# Patient Record
Sex: FEMALE | Race: WHITE | Employment: FULL TIME | URBAN - METROPOLITAN AREA
[De-identification: names, ages, dates, MRNs, and addresses within clinical notes are randomized per-mention and may not be internally consistent; named-entity substitution may affect disease eponyms.]

---

## 2017-06-04 ENCOUNTER — HOSPITAL ENCOUNTER (EMERGENCY)
Age: 47
Discharge: HOME | End: 2017-06-04
Attending: EMERGENCY MEDICINE
Payer: COMMERCIAL

## 2017-06-04 ENCOUNTER — APPOINTMENT (OUTPATIENT)
Dept: GENERAL RADIOLOGY | Age: 47
End: 2017-06-04
Attending: EMERGENCY MEDICINE
Payer: COMMERCIAL

## 2017-06-04 VITALS
SYSTOLIC BLOOD PRESSURE: 112 MMHG | DIASTOLIC BLOOD PRESSURE: 72 MMHG | OXYGEN SATURATION: 98 % | TEMPERATURE: 97.8 F | HEART RATE: 71 BPM | BODY MASS INDEX: 25.48 KG/M2 | RESPIRATION RATE: 16 BRPM | HEIGHT: 69 IN | WEIGHT: 172 LBS

## 2017-06-04 DIAGNOSIS — S76.012A HIP STRAIN, LEFT, INITIAL ENCOUNTER: ICD-10-CM

## 2017-06-04 DIAGNOSIS — M54.50 ACUTE LEFT-SIDED LOW BACK PAIN WITHOUT SCIATICA: Primary | ICD-10-CM

## 2017-06-04 PROCEDURE — 96372 THER/PROPH/DIAG INJ SC/IM: CPT

## 2017-06-04 PROCEDURE — 74011250636 HC RX REV CODE- 250/636: Performed by: EMERGENCY MEDICINE

## 2017-06-04 PROCEDURE — 99282 EMERGENCY DEPT VISIT SF MDM: CPT

## 2017-06-04 PROCEDURE — 73502 X-RAY EXAM HIP UNI 2-3 VIEWS: CPT

## 2017-06-04 PROCEDURE — 72100 X-RAY EXAM L-S SPINE 2/3 VWS: CPT

## 2017-06-04 RX ORDER — CYCLOBENZAPRINE HCL 5 MG
5 TABLET ORAL
Qty: 15 TAB | Refills: 0 | Status: SHIPPED | OUTPATIENT
Start: 2017-06-04 | End: 2017-06-09

## 2017-06-04 RX ORDER — CYCLOBENZAPRINE HCL 10 MG
10 TABLET ORAL
Status: DISCONTINUED | OUTPATIENT
Start: 2017-06-04 | End: 2017-06-05 | Stop reason: HOSPADM

## 2017-06-04 RX ORDER — IBUPROFEN 600 MG/1
600 TABLET ORAL
Qty: 20 TAB | Refills: 0 | Status: SHIPPED | OUTPATIENT
Start: 2017-06-04 | End: 2017-06-09

## 2017-06-04 RX ORDER — HYDROCODONE BITARTRATE AND ACETAMINOPHEN 5; 325 MG/1; MG/1
1 TABLET ORAL
Qty: 12 TAB | Refills: 0 | Status: ON HOLD | OUTPATIENT
Start: 2017-06-04 | End: 2017-06-09

## 2017-06-04 RX ORDER — KETOROLAC TROMETHAMINE 30 MG/ML
60 INJECTION, SOLUTION INTRAMUSCULAR; INTRAVENOUS
Status: COMPLETED | OUTPATIENT
Start: 2017-06-04 | End: 2017-06-04

## 2017-06-04 RX ADMIN — KETOROLAC TROMETHAMINE 60 MG: 30 INJECTION, SOLUTION INTRAMUSCULAR at 22:05

## 2017-06-05 ENCOUNTER — PATIENT OUTREACH (OUTPATIENT)
Dept: OTHER | Age: 47
End: 2017-06-05

## 2017-06-05 NOTE — ED NOTES
Pt discharged from ED with prescriptions and follow up care instructions; pt verbalized understanding. VSS. NAD. Pt reports improved pain of 2/10 at this time. Ambulatory from ED with steady gait.

## 2017-06-05 NOTE — PROGRESS NOTES
YOUNG NOTE:     Ms. Clarissa Tavarez  has been contacted regarding recent ED visit for radicular back pain after driving and erika ing luggage. .   Verified  and address for HIPAA security. Explained role and verified PCP. Discharge medications were reviewed with the patient by telephone. Date of ED Admission:       Facility patient visited:   [facility]   Reason for Visit:   Sciatica and radicular symptoms   Reviewed scripts given by ED? Yes      Able to obtain prescribed medication? Yes  - only taking ibuprophen at work. Plans to take flexeril later when she can rest and not drive. How is the patient feeling? Pt stated she is better today, still feels radicular symptoms on the left. Better with hot shower. Does the patient have any questions or problems? Not at this time - plans to FU with PCP and maybe be referred to PT when she returns home. Call in. Any barriers with transportation? no   Follow-up Appointment date: Not yet - call in. Reviewed Discharge instructions:  She is doing Stretches as on AVS.  Spoke about ice as better for acute inflammation, but she is traveling, states she discussed with MD in ED. Standing better than walking. Provided patient with my name and contact information and instructed patient if there are any question to call. No further needs at this time. FU in two weeks         Prescriptions   Medication Sig Dispense Start Date End Date Auth. Provider   ibuprofen (MOTRIN) 600 mg tablet Take 1 Tab by mouth every six (6) hours as needed for Pain. 20 Tab 2017  Virginia Del Cid MD   cyclobenzaprine (FLEXERIL) 5 mg tablet Take 1 Tab by mouth three (3) times daily (with meals) for 5 days. 15 Tab 2017 Virginia Del Cid MD   HYDROcodone-acetaminophen [facility]) 5-325 mg per tablet Take 1 Tab by mouth every four (4) hours as needed for Pain. Max Daily Amount: 6 Tabs.  12 Tab 2017

## 2017-06-05 NOTE — ED PROVIDER NOTES
HPI Comments: 42 y.o. female with past medical history significant for neurocardiogenic syncope who presents with chief complaint of back pain. Patient complains of mid lower back pain that radiates to her right side that began today after she was loading some luggage into her car. Patient also complains of some left hip pain that's worse with movement. Patient states she was driving down from [place] when the pain first started. Patient states she felt fine during the first half of the drive then, while she was getting gas and walking around, the pain worsened. Patient states the pain does not radiate down her legs though she is having some difficulty moving her left leg. Patient states she took 4 Ibuprofen, with her last dose at 1730, and 2 Tylenol, last dose at [address]. Patient denies numbness or tingling. Patient denies previous occurrence of sx. There are no other acute medical concerns at this time. Social hx: never smoker, no alcohol  PCP: None    Note written by Chris Dawn, as dictated by Gillian Morris MD 9:30 PM     The history is provided by the patient. No  was used. Past Medical History:   Diagnosis Date    Endocrine disease     Hypothyroid    Neurological disorder     Neurocardiogenic syncope       History reviewed. No pertinent surgical history. History reviewed. No pertinent family history. Social History     Social History    Marital status:      Spouse name: N/A    Number of children: N/A    Years of education: N/A     Occupational History    Not on file. Social History Main Topics    Smoking status: Never Smoker    Smokeless tobacco: Not on file    Alcohol use No    Drug use: No    Sexual activity: Yes     Partners: Female     Birth control/ protection: None     Other Topics Concern    Not on file     Social History Narrative         ALLERGIES: Penicillin g    Review of Systems   Constitutional: Negative for fever.    HENT: Negative for facial swelling. Eyes: Negative for visual disturbance. Respiratory: Negative for chest tightness. Cardiovascular: Negative for chest pain. Gastrointestinal: Negative for abdominal pain. Genitourinary: Negative for dysuria. Musculoskeletal: Positive for arthralgias. Skin: Negative for rash. Neurological: Negative for dizziness and numbness. Hematological: Negative for adenopathy. Psychiatric/Behavioral: Negative for suicidal ideas. Vitals:    06/04/17 2032   BP: 117/75   Pulse: (!) 121   Resp: 18   Temp: 97.9 °F (36.6 °C)   SpO2: 96%   Weight: 78 kg (172 lb)   Height: 5' 9\" (1.753 m)            Physical Exam   Constitutional: She is oriented to person, place, and time. She appears well-developed and well-nourished. No distress. HENT:   Head: Normocephalic and atraumatic. Mouth/Throat: Oropharynx is clear and moist.   Eyes: Pupils are equal, round, and reactive to light. No scleral icterus. Neck: Normal range of motion. Neck supple. No thyromegaly present. Cardiovascular: Normal rate, regular rhythm, normal heart sounds and intact distal pulses. No murmur heard. Pulmonary/Chest: Effort normal and breath sounds normal. No respiratory distress. Abdominal: Soft. Bowel sounds are normal. She exhibits no distension. There is no tenderness. Musculoskeletal: Normal range of motion. She exhibits no edema. Neurological: She is alert and oriented to person, place, and time. Skin: Skin is warm and dry. No rash noted. She is not diaphoretic. Nursing note and vitals reviewed. Note written by Chris Hassan, as dictated by Bryson Leigh MD 9:33 PM      [facility]  ED Course       Procedures    Xray unremarkable. Ambulating without assistance. Stable for discharge home. Most likely muscular injury.

## 2017-06-05 NOTE — ED TRIAGE NOTES
Patient presents to the emergency department reports sacral and left hip pain. Patient states she was putting luggage in her trunk and driving down from [place]. Patient states she felt something when she placed the luggage into the car, but was not in a lot of pain at that point.

## 2017-06-05 NOTE — DISCHARGE INSTRUCTIONS
Back Pain: Care Instructions  Your Care Instructions    Back pain has many possible causes. It is often related to problems with muscles and ligaments of the back. It may also be related to problems with the nerves, discs, or bones of the back. Moving, lifting, standing, sitting, or sleeping in an awkward way can strain the back. Sometimes you don't notice the injury until later. Arthritis is another common cause of back pain. Although it may hurt a lot, back pain usually improves on its own within several weeks. Most people recover in 12 weeks or less. Using good home treatment and being careful not to stress your back can help you feel better sooner. Follow-up care is a key part of your treatment and safety. Be sure to make and go to all appointments, and call your doctor if you are having problems. Its also a good idea to know your test results and keep a list of the medicines you take. How can you care for yourself at home? · Sit or lie in positions that are most comfortable and reduce your pain. Try one of these positions when you lie down:  ¨ Lie on your back with your knees bent and supported by large pillows. ¨ Lie on the floor with your legs on the seat of a sofa or chair. Frances Men on your side with your knees and hips bent and a pillow between your legs. ¨ Lie on your stomach if it does not make pain worse. · Do not sit up in bed, and avoid soft couches and twisted positions. Bed rest can help relieve pain at first, but it delays healing. Avoid bed rest after the first day of back pain. · Change positions every 30 minutes. If you must sit for long periods of time, take breaks from sitting. Get up and walk around, or lie in a comfortable position. · Try using a heating pad on a low or medium setting for 15 to 20 minutes every 2 or 3 hours. Try a warm shower in place of one session with the heating pad. · You can also try an ice pack for 10 to 15 minutes every 2 to 3 hours.  Put a thin cloth between the ice pack and your skin. · Take pain medicines exactly as directed. ¨ If the doctor gave you a prescription medicine for pain, take it as prescribed. ¨ If you are not taking a prescription pain medicine, ask your doctor if you can take an over-the-counter medicine. · Take short walks several times a day. You can start with 5 to 10 minutes, 3 or 4 times a day, and work up to longer walks. Walk on level surfaces and avoid hills and stairs until your back is better. · Return to work and other activities as soon as you can. Continued rest without activity is usually not good for your back. · To prevent future back pain, do exercises to stretch and strengthen your back and stomach. Learn how to use good posture, safe lifting techniques, and proper body mechanics. When should you call for help? Call your doctor now or seek immediate medical care if:  · You have new or worsening numbness in your legs. · You have new or worsening weakness in your legs. (This could make it hard to stand up.)  · You lose control of your bladder or bowels. Watch closely for changes in your health, and be sure to contact your doctor if:  · Your pain gets worse. · You are not getting better after 2 weeks. Where can you learn more? Go to http://nayana-roberta.info/. Enter B740 in the search box to learn more about \"Back Pain: Care Instructions. \"  Current as of: May 23, 2016  Content Version: 11.2  © 0867-4709 [employer]. Care instructions adapted under license by [employer] (which disclaims liability or warranty for this information). If you have questions about a medical condition or this instruction, always ask your healthcare professional. [address] any warranty or liability for your use of this information. We hope that we have addressed all of your medical concerns.  The examination and treatment you received in the Emergency Department were for an emergent problem and were not intended as complete care. It is important that you follow up with your healthcare provider(s) for ongoing care. If your symptoms worsen or do not improve as expected, and you are unable to reach your usual health care provider(s), you should return to the Emergency Department. Today's healthcare is undergoing tremendous change, and patient satisfaction surveys are one of the many tools to assess the quality of medical care. You may receive a survey from the [employer] regarding your experience in the Emergency Department. I hope that your experience has been completely positive, particularly the medical care that I provided. As such, please participate in the survey; anything less than excellent does not meet my expectations or intentions. [address] [place] [address] and [address] [place] [address] participate in nationally recognized quality of care measures. If your blood pressure is greater than 120/80, as reported below, we urge that you seek medical care to address the potential of high blood pressure, commonly known as hypertension. Hypertension can be hereditary or can be caused by certain medical conditions, pain, stress, or \"white coat syndrome. \"       Please make an appointment with your health care provider(s) for follow up of your Emergency Department visit. VITALS:   Patient Vitals for the past 8 hrs:   Temp Pulse Resp BP SpO2   06/04/17 2032 97.9 °F (36.6 °C) (!) 121 18 117/75 96 %          Thank you for allowing us to provide you with medical care today. We realize that you have many choices for your emergency care needs. Please choose us in the future for any continued health care needs. Regards,           Virginia Del Cid MD    [address] [place] [address].   Office: [phone]            No results found for this or any previous visit (from the past 24 hour(s)).     Xr Spine Lumb 2 Or 3 V    Result Date: 6/4/2017  INDICATION:  L lower back pain. EXAM: 3 views lumbar spine. No comparisons. FINDINGS: Alignment is normal. Disc spaces are preserved. No bony destructive lesions or fractures. IMPRESSION: 1. No acute abnormality     Xr Hip Lt W Or Wo Pelv 2-3 Vws    Result Date: 6/4/2017  EXAM:  XR HIP LT W OR WO PELV 2-3 VWS INDICATION:   pain L hip. Questionable injury lifting luggage COMPARISON: None. FINDINGS: An AP view of the pelvis and a frogleg lateral view of the left hip demonstrate no fracture, dislocation or other acute abnormality. IMPRESSION:  No acute abnormality.

## 2017-06-06 ENCOUNTER — HOSPITAL ENCOUNTER (EMERGENCY)
Age: 47
Discharge: ANOTHER HOSPITAL | End: 2017-06-06
Attending: FAMILY MEDICINE

## 2017-06-06 ENCOUNTER — HOSPITAL ENCOUNTER (INPATIENT)
Age: 47
LOS: 3 days | Discharge: HOME | DRG: 299 | End: 2017-06-09
Attending: EMERGENCY MEDICINE | Admitting: FAMILY MEDICINE
Payer: COMMERCIAL

## 2017-06-06 ENCOUNTER — APPOINTMENT (OUTPATIENT)
Dept: CT IMAGING | Age: 47
DRG: 299 | End: 2017-06-06
Attending: PHYSICIAN ASSISTANT
Payer: COMMERCIAL

## 2017-06-06 VITALS
DIASTOLIC BLOOD PRESSURE: 81 MMHG | HEART RATE: 118 BPM | HEIGHT: 69 IN | TEMPERATURE: 97.8 F | BODY MASS INDEX: 24.62 KG/M2 | WEIGHT: 166.2 LBS | SYSTOLIC BLOOD PRESSURE: 132 MMHG | OXYGEN SATURATION: 98 % | RESPIRATION RATE: 16 BRPM

## 2017-06-06 DIAGNOSIS — M79.605 LEFT LEG PAIN: Primary | ICD-10-CM

## 2017-06-06 DIAGNOSIS — M54.5 ACUTE LEFT-SIDED LOW BACK PAIN, WITH SCIATICA PRESENCE UNSPECIFIED: ICD-10-CM

## 2017-06-06 DIAGNOSIS — I26.99 OTHER ACUTE PULMONARY EMBOLISM WITHOUT ACUTE COR PULMONALE (HCC): Primary | ICD-10-CM

## 2017-06-06 PROBLEM — R00.0 TACHYCARDIA: Status: ACTIVE | Noted: 2017-06-06

## 2017-06-06 PROBLEM — I82.402 ACUTE DEEP VEIN THROMBOSIS (DVT) OF LEFT LOWER EXTREMITY (HCC): Status: ACTIVE | Noted: 2017-06-06

## 2017-06-06 LAB
ALBUMIN SERPL BCP-MCNC: 3.8 G/DL (ref 3.5–5)
ALBUMIN/GLOB SERPL: 0.8 {RATIO} (ref 1.1–2.2)
ALP SERPL-CCNC: 81 U/L (ref 45–117)
ALT SERPL-CCNC: 23 U/L (ref 12–78)
ANION GAP BLD CALC-SCNC: 16 MMOL/L (ref 5–15)
APTT PPP: 28.1 SEC (ref 22.1–32.5)
AST SERPL W P-5'-P-CCNC: 12 U/L (ref 15–37)
BASOPHILS # BLD AUTO: 0 K/UL (ref 0–0.1)
BASOPHILS # BLD: 0 % (ref 0–1)
BILIRUB SERPL-MCNC: 0.5 MG/DL (ref 0.2–1)
BUN SERPL-MCNC: 10 MG/DL (ref 6–20)
BUN/CREAT SERPL: 18 (ref 12–20)
CALCIUM SERPL-MCNC: 9.3 MG/DL (ref 8.5–10.1)
CHLORIDE SERPL-SCNC: 100 MMOL/L (ref 97–108)
CO2 SERPL-SCNC: 20 MMOL/L (ref 21–32)
CREAT SERPL-MCNC: 0.55 MG/DL (ref 0.55–1.02)
EOSINOPHIL # BLD: 0.4 K/UL (ref 0–0.4)
EOSINOPHIL NFR BLD: 4 % (ref 0–7)
ERYTHROCYTE [DISTWIDTH] IN BLOOD BY AUTOMATED COUNT: 13.2 % (ref 11.5–14.5)
GLOBULIN SER CALC-MCNC: 4.6 G/DL (ref 2–4)
GLUCOSE SERPL-MCNC: 77 MG/DL (ref 65–100)
HCT VFR BLD AUTO: 39 % (ref 35–47)
HGB BLD-MCNC: 13.5 G/DL (ref 11.5–16)
INR PPP: 1 (ref 0.9–1.1)
LYMPHOCYTES # BLD AUTO: 12 % (ref 12–49)
LYMPHOCYTES # BLD: 1.5 K/UL (ref 0.8–3.5)
MCH RBC QN AUTO: 28.1 PG (ref 26–34)
MCHC RBC AUTO-ENTMCNC: 34.6 G/DL (ref 30–36.5)
MCV RBC AUTO: 81.1 FL (ref 80–99)
MONOCYTES # BLD: 1.1 K/UL (ref 0–1)
MONOCYTES NFR BLD AUTO: 9 % (ref 5–13)
NEUTS SEG # BLD: 9.4 K/UL (ref 1.8–8)
NEUTS SEG NFR BLD AUTO: 75 % (ref 32–75)
PLATELET # BLD AUTO: 364 K/UL (ref 150–400)
POTASSIUM SERPL-SCNC: 3.2 MMOL/L (ref 3.5–5.1)
PROT SERPL-MCNC: 8.4 G/DL (ref 6.4–8.2)
PROTHROMBIN TIME: 10.4 SEC (ref 9–11.1)
RBC # BLD AUTO: 4.81 M/UL (ref 3.8–5.2)
SODIUM SERPL-SCNC: 136 MMOL/L (ref 136–145)
THERAPEUTIC RANGE,PTTT: NORMAL SECS (ref 58–77)
TROPONIN I SERPL-MCNC: <0.04 NG/ML
WBC # BLD AUTO: 12.5 K/UL (ref 3.6–11)

## 2017-06-06 PROCEDURE — 74011250636 HC RX REV CODE- 250/636: Performed by: PHYSICIAN ASSISTANT

## 2017-06-06 PROCEDURE — 84484 ASSAY OF TROPONIN QUANT: CPT | Performed by: FAMILY MEDICINE

## 2017-06-06 PROCEDURE — 96374 THER/PROPH/DIAG INJ IV PUSH: CPT

## 2017-06-06 PROCEDURE — 74011636320 HC RX REV CODE- 636/320: Performed by: EMERGENCY MEDICINE

## 2017-06-06 PROCEDURE — 74011000258 HC RX REV CODE- 258: Performed by: EMERGENCY MEDICINE

## 2017-06-06 PROCEDURE — 65660000000 HC RM CCU STEPDOWN

## 2017-06-06 PROCEDURE — 96372 THER/PROPH/DIAG INJ SC/IM: CPT

## 2017-06-06 PROCEDURE — 71275 CT ANGIOGRAPHY CHEST: CPT

## 2017-06-06 PROCEDURE — 93971 EXTREMITY STUDY: CPT

## 2017-06-06 PROCEDURE — 74011250637 HC RX REV CODE- 250/637: Performed by: FAMILY MEDICINE

## 2017-06-06 PROCEDURE — 85610 PROTHROMBIN TIME: CPT | Performed by: FAMILY MEDICINE

## 2017-06-06 PROCEDURE — 85025 COMPLETE CBC W/AUTO DIFF WBC: CPT | Performed by: PHYSICIAN ASSISTANT

## 2017-06-06 PROCEDURE — 36415 COLL VENOUS BLD VENIPUNCTURE: CPT | Performed by: PHYSICIAN ASSISTANT

## 2017-06-06 PROCEDURE — 93041 RHYTHM ECG TRACING: CPT

## 2017-06-06 PROCEDURE — 85730 THROMBOPLASTIN TIME PARTIAL: CPT | Performed by: FAMILY MEDICINE

## 2017-06-06 PROCEDURE — 94761 N-INVAS EAR/PLS OXIMETRY MLT: CPT

## 2017-06-06 PROCEDURE — 80053 COMPREHEN METABOLIC PANEL: CPT | Performed by: PHYSICIAN ASSISTANT

## 2017-06-06 PROCEDURE — 99284 EMERGENCY DEPT VISIT MOD MDM: CPT

## 2017-06-06 RX ORDER — LEVOTHYROXINE SODIUM 25 UG/1
12.5 TABLET ORAL
COMMUNITY

## 2017-06-06 RX ORDER — ESCITALOPRAM OXALATE 10 MG/1
20 TABLET ORAL
Status: DISCONTINUED | OUTPATIENT
Start: 2017-06-07 | End: 2017-06-09 | Stop reason: HOSPADM

## 2017-06-06 RX ORDER — HYDROCODONE BITARTRATE AND ACETAMINOPHEN 5; 325 MG/1; MG/1
1 TABLET ORAL
Status: DISCONTINUED | OUTPATIENT
Start: 2017-06-06 | End: 2017-06-07

## 2017-06-06 RX ORDER — SODIUM CHLORIDE 0.9 % (FLUSH) 0.9 %
5-10 SYRINGE (ML) INJECTION AS NEEDED
Status: DISCONTINUED | OUTPATIENT
Start: 2017-06-06 | End: 2017-06-09 | Stop reason: HOSPADM

## 2017-06-06 RX ORDER — SODIUM CHLORIDE 0.9 % (FLUSH) 0.9 %
10 SYRINGE (ML) INJECTION
Status: COMPLETED | OUTPATIENT
Start: 2017-06-06 | End: 2017-06-06

## 2017-06-06 RX ORDER — LEVOTHYROXINE SODIUM 112 UG/1
112 TABLET ORAL
COMMUNITY

## 2017-06-06 RX ORDER — HYDROMORPHONE HYDROCHLORIDE 1 MG/ML
1 INJECTION, SOLUTION INTRAMUSCULAR; INTRAVENOUS; SUBCUTANEOUS
Status: DISCONTINUED | OUTPATIENT
Start: 2017-06-06 | End: 2017-06-06

## 2017-06-06 RX ORDER — ENOXAPARIN SODIUM 100 MG/ML
1 INJECTION SUBCUTANEOUS
Status: COMPLETED | OUTPATIENT
Start: 2017-06-06 | End: 2017-06-06

## 2017-06-06 RX ORDER — VENLAFAXINE HYDROCHLORIDE 150 MG/1
150 CAPSULE, EXTENDED RELEASE ORAL DAILY
COMMUNITY

## 2017-06-06 RX ORDER — ESCITALOPRAM OXALATE 20 MG/1
20 TABLET ORAL
COMMUNITY

## 2017-06-06 RX ORDER — SODIUM CHLORIDE 0.9 % (FLUSH) 0.9 %
5-10 SYRINGE (ML) INJECTION EVERY 8 HOURS
Status: DISCONTINUED | OUTPATIENT
Start: 2017-06-07 | End: 2017-06-09 | Stop reason: HOSPADM

## 2017-06-06 RX ORDER — LEVOTHYROXINE SODIUM 50 UG/1
50 TABLET ORAL
COMMUNITY

## 2017-06-06 RX ORDER — ENOXAPARIN SODIUM 100 MG/ML
1 INJECTION SUBCUTANEOUS EVERY 12 HOURS
Status: DISCONTINUED | OUTPATIENT
Start: 2017-06-07 | End: 2017-06-09 | Stop reason: HOSPADM

## 2017-06-06 RX ORDER — LEVONORGESTREL AND ETHINYL ESTRADIOL 100-20(84)
1 KIT ORAL DAILY
Status: ON HOLD | COMMUNITY
End: 2017-06-07 | Stop reason: SINTOL

## 2017-06-06 RX ORDER — MORPHINE SULFATE 4 MG/ML
4 INJECTION, SOLUTION INTRAMUSCULAR; INTRAVENOUS
Status: COMPLETED | OUTPATIENT
Start: 2017-06-06 | End: 2017-06-06

## 2017-06-06 RX ORDER — GLUCOSAMINE/CHONDR SU A SOD 750-600 MG
1 TABLET ORAL DAILY
COMMUNITY

## 2017-06-06 RX ORDER — VENLAFAXINE HYDROCHLORIDE 150 MG/1
150 CAPSULE, EXTENDED RELEASE ORAL DAILY
Status: DISCONTINUED | OUTPATIENT
Start: 2017-06-07 | End: 2017-06-09 | Stop reason: HOSPADM

## 2017-06-06 RX ADMIN — ENOXAPARIN SODIUM 80 MG: 80 INJECTION SUBCUTANEOUS at 22:31

## 2017-06-06 RX ADMIN — IOPAMIDOL 80 ML: 755 INJECTION, SOLUTION INTRAVENOUS at 21:20

## 2017-06-06 RX ADMIN — SODIUM CHLORIDE 100 ML: 900 INJECTION, SOLUTION INTRAVENOUS at 21:20

## 2017-06-06 RX ADMIN — Medication 4 MG: at 20:20

## 2017-06-06 RX ADMIN — Medication 10 ML: at 23:40

## 2017-06-06 RX ADMIN — HYDROCODONE BITARTRATE AND ACETAMINOPHEN 1 TABLET: 5; 325 TABLET ORAL at 23:40

## 2017-06-06 RX ADMIN — Medication 4 MG: at 23:13

## 2017-06-06 RX ADMIN — Medication 10 ML: at 21:20

## 2017-06-06 RX ADMIN — ESCITALOPRAM OXALATE 20 MG: 10 TABLET ORAL at 23:40

## 2017-06-06 NOTE — IP AVS SNAPSHOT
Current Discharge Medication List  
  
START taking these medications Dose & Instructions Dispensing Information Comments Morning Noon Evening Bedtime * apixaban 5 mg tablet Commonly known as:  Arvel Us Your last dose was: Your next dose is: Take 2 Tabs BID X 7 days, then 1 tab BID X 23 days. Quantity:  74 Tab Refills:  0  
     
   
   
   
  
 * apixaban 5 mg tablet Commonly known as:  Arvel Us Your last dose was: Your next dose is:    
   
   
 Dose:  5 mg Take 1 Tab by mouth two (2) times a day for 180 days. Quantity:  60 Tab Refills:  4  
     
   
   
   
  
 famotidine 20 mg tablet Commonly known as:  PEPCID Your last dose was: Your next dose is:    
   
   
 Dose:  20 mg Take 1 Tab by mouth two (2) times a day. Quantity:  20 Tab Refills:  0  
     
   
   
   
  
 naproxen 500 mg tablet Commonly known as:  NAPROSYN Your last dose was: Your next dose is:    
   
   
 Dose:  500 mg Take 1 Tab by mouth two (2) times daily (with meals). Quantity:  6 Tab Refills:  0  
     
   
   
   
  
 polyethylene glycol 17 gram packet Commonly known as:  Jessenia John Your last dose was: Your next dose is:    
   
   
 Dose:  17 g Take 1 Packet by mouth daily. Quantity:  30 Packet Refills:  0  
     
   
   
   
  
 senna-docusate 8.6-50 mg per tablet Commonly known as:  Susan Rogers Your last dose was: Your next dose is:    
   
   
 Dose:  2 Tab Take 2 Tabs by mouth two (2) times a day. Quantity:  20 Tab Refills:  0  
     
   
   
   
  
 * Notice: This list has 2 medication(s) that are the same as other medications prescribed for you. Read the directions carefully, and ask your doctor or other care provider to review them with you. CONTINUE these medications which have NOT CHANGED Dose & Instructions Dispensing Information Comments Morning Noon Evening Bedtime Biotin 2,500 mcg Cap Your last dose was: Your next dose is:    
   
   
 Dose:  1 Can Take 1 Can by mouth daily. Refills:  0  
     
   
   
   
  
 escitalopram oxalate 20 mg tablet Commonly known as:  Latanya Katarzyna Your last dose was: Your next dose is:    
   
   
 Dose:  20 mg Take 20 mg by mouth nightly. Refills:  0 HYDROcodone-acetaminophen 5-325 mg per tablet Commonly known as:  Will Khan Your last dose was: Your next dose is:    
   
   
 Dose:  1 Tab Take 1 Tab by mouth every four (4) hours as needed for Pain. Max Daily Amount: 6 Tabs. Quantity:  12 Tab Refills:  0  
     
   
   
   
  
 * levothyroxine 112 mcg tablet Commonly known as:  SYNTHROID Your last dose was: Your next dose is:    
   
   
 Dose:  112 mcg Take 112 mcg by mouth Daily (before breakfast). Takes 112+12.5+50 Refills:  0  
     
   
   
   
  
 * levothyroxine 25 mcg tablet Commonly known as:  SYNTHROID Your last dose was: Your next dose is:    
   
   
 Dose:  12.5 mcg Take 12.5 mcg by mouth Daily (before breakfast). Takes 112+12.5+50 Refills:  0  
     
   
   
   
  
 * levothyroxine 50 mcg tablet Commonly known as:  SYNTHROID Your last dose was: Your next dose is:    
   
   
 Dose:  50 mcg Take 50 mcg by mouth Daily (before breakfast). Takes 112+12.5+50 Refills:  0  
     
   
   
   
  
 venlafaxine- mg capsule Commonly known as:  EFFEXOR-XR Your last dose was: Your next dose is:    
   
   
 Dose:  150 mg Take 150 mg by mouth daily. Refills:  0  
     
   
   
   
  
 * Notice: This list has 3 medication(s) that are the same as other medications prescribed for you. Read the directions carefully, and ask your doctor or other care provider to review them with you. STOP taking these medications CAMRESE LO 0.10 mg-20 mcg (84)/10 mcg (7) 3mpk Generic drug:  L-Norgest&E Estradiol-E Estrad  
   
  
 cyclobenzaprine 5 mg tablet Commonly known as:  FLEXERIL  
   
  
 ibuprofen 600 mg tablet Commonly known as:  MOTRIN Where to Get Your Medications Information on where to get these meds will be given to you by the nurse or doctor. ! Ask your nurse or doctor about these medications  
  apixaban 5 mg tablet  
 apixaban 5 mg tablet  
 famotidine 20 mg tablet HYDROcodone-acetaminophen 5-325 mg per tablet  
 naproxen 500 mg tablet  
 polyethylene glycol 17 gram packet  
 senna-docusate 8.6-50 mg per tablet

## 2017-06-06 NOTE — ED TRIAGE NOTES
Sent by [facility] for r/o DVT to left leg.  +pain, +redness, +increased warmth. Denies CP or SOB. Taking 600mg ibuprofen with minimal relief.

## 2017-06-06 NOTE — UC PROVIDER NOTE
HPI Comments: Reta Morrissey with hx of neurogenic syncope, hypothyroidism presents with persistent left sided low back pain associated with left hip pain for the past 2 days after lifting heavy suitcases but today noticed left lower leg swelling, erythema, and pain. Was seen in [facility] [place] ED at the day of onset xray od lumbar spine and left hip negative; given motrin, flexeril without significant improvement. The history is provided by the patient. Past Medical History:   Diagnosis Date    Endocrine disease     Hypothyroid    Neurological disorder     Neurocardiogenic syncope        History reviewed. No pertinent surgical history. History reviewed. No pertinent family history. Social History     Social History    Marital status:      Spouse name: N/A    Number of children: N/A    Years of education: N/A     Occupational History    Not on file. Social History Main Topics    Smoking status: Never Smoker    Smokeless tobacco: Not on file    Alcohol use No    Drug use: No    Sexual activity: Yes     Partners: Female     Birth control/ protection: None     Other Topics Concern    Not on file     Social History Narrative                ALLERGIES: Penicillin g    Review of Systems   Constitutional: Negative for chills and fever. Respiratory: Negative for chest tightness, shortness of breath and wheezing. Cardiovascular: Negative for chest pain and palpitations. Gastrointestinal: Negative for nausea and vomiting. Musculoskeletal: Positive for arthralgias, back pain and gait problem. Skin: Positive for color change. Neurological: Negative for dizziness and headaches. Vitals:    06/06/17 1753   BP: 132/81   Pulse: (!) 118   Resp: 16   Temp: 97.8 °F (36.6 °C)   SpO2: 98%   Weight: 75.4 kg (166 lb 3.2 oz)   Height: 5' 9\" (1.753 m)       Physical Exam   Constitutional: She appears well-developed and well-nourished. She appears distressed.    Musculoskeletal:        Left hip: She exhibits tenderness. She exhibits normal range of motion, normal strength, no bony tenderness, no swelling, no crepitus, no deformity and no laceration. Lumbar back: She exhibits decreased range of motion, tenderness, bony tenderness, pain and spasm. She exhibits no swelling, no edema, no deformity and no laceration. Back:         Right lower leg: She exhibits tenderness, swelling and edema. She exhibits no bony tenderness, no deformity and no laceration. LLE: erythematous   Neurological: She is alert. Skin: She is not diaphoretic. Psychiatric: She has a normal mood and affect. Her behavior is normal. Judgment and thought content normal.   Nursing note and vitals reviewed. MDM     Differential Diagnosis; Clinical Impression; Plan:     CLINICAL IMPRESSION:  Left leg pain  (primary encounter diagnosis)  Acute left-sided low back pain, with sciatica presence unspecified    Plan:  1. Referred to ER for R/o DVT  Risk of Significant Complications, Morbidity, and/or Mortality:   Presenting problems: Moderate  Management options:   Moderate      Procedures

## 2017-06-06 NOTE — ED NOTES
Bedside and Verbal shift change report given to Leif (oncoming nurse) by Theo Rubio RN (offgoing nurse). Report included the following information SBAR, ED Summary and Recent Results.

## 2017-06-06 NOTE — ED PROVIDER NOTES
HPI Comments: 41 yo  female with medical hx remarkable for hypothyroidism presenting ambulatory to the ED continued worsening left sided back pain and left leg pain for the past two days that started after lifting a suitcase. Pt seen in this ED and dxd with sciatica following lumb spine xray. Taking flexeril, ibu 600 mg and norco at night with minimal resolve. Today noticed significant erythema and edema to the lt lower extremity. Seen at [facility] urgent care and referred to ED with concern for DVT/PE. Drove from [place] three days ago. No other significant travel. Denies prior hx of DVT/PE. No fever, chills, headache, chest pain, SOB, abdominal pain, constipation, diarrhea or urinary complaint. +OCP use. _hx surgery or prior DVT/PE attributed to patient or family. Patient is a 42 y.o. female presenting with lower extremity edema. The history is provided by the patient. Leg Swelling    Associated symptoms include back pain. Pertinent negatives include no numbness and no neck pain. Past Medical History:   Diagnosis Date    Endocrine disease     Hypothyroid    Neurological disorder     Neurocardiogenic syncope       History reviewed. No pertinent surgical history. History reviewed. No pertinent family history. Social History     Social History    Marital status:      Spouse name: N/A    Number of children: N/A    Years of education: N/A     Occupational History    Not on file. Social History Main Topics    Smoking status: Never Smoker    Smokeless tobacco: Not on file    Alcohol use No    Drug use: No    Sexual activity: Yes     Partners: Female     Birth control/ protection: None     Other Topics Concern    Not on file     Social History Narrative         ALLERGIES: Penicillin g    Review of Systems   Constitutional: Negative. Negative for chills, fatigue and fever. HENT: Negative.   Negative for congestion, ear pain, facial swelling, rhinorrhea, sneezing and sore throat. Eyes: Negative for pain, discharge and itching. Respiratory: Negative for cough, chest tightness and shortness of breath. Cardiovascular: Negative. Negative for chest pain and leg swelling. Gastrointestinal: Negative. Negative for abdominal distention, abdominal pain, constipation, diarrhea, nausea and vomiting. Genitourinary: Negative for difficulty urinating, frequency and urgency. Musculoskeletal: Positive for arthralgias, back pain and joint swelling. Negative for neck pain and neck stiffness. Skin: Positive for color change. Negative for rash. Neurological: Negative for dizziness, numbness and headaches. Psychiatric/Behavioral: Negative for confusion and decreased concentration. All other systems reviewed and are negative. Vitals:    06/06/17 1844   BP: 151/81   Pulse: (!) 131   Resp: 22   Temp: 98.6 °F (37 °C)   SpO2: 99%   Weight: 83.6 kg (184 lb 6.4 oz)   Height: 5' 9\" (1.753 m)            Physical Exam   Constitutional: She is oriented to person, place, and time. She appears well-developed and well-nourished. No distress.  adult female appears uncomfortable     HENT:   Head: Normocephalic and atraumatic. Left Ear: External ear normal.   Eyes: Conjunctivae are normal. Pupils are equal, round, and reactive to light. Neck: Normal range of motion. Neck supple. Cardiovascular: Regular rhythm and normal heart sounds. Tachycardic     Pulmonary/Chest: No respiratory distress. She has no wheezes. tachypneic     Abdominal: Soft. Bowel sounds are normal. She exhibits no distension. There is no tenderness. There is no rebound. Musculoskeletal: Normal range of motion. Legs:  Neurological: She is alert and oriented to person, place, and time. Skin: Skin is warm and dry. No ecchymosis, no laceration and no lesion noted. Psychiatric: She has a normal mood and affect. Her behavior is normal.   Nursing note and vitals reviewed.        MDM  Number of Diagnoses or Management Options  Other acute pulmonary embolism without acute cor pulmonale [facility]):   Diagnosis management comments: 43 yo  female with swelling and pain to left leg and back. Pt uncomfortable appearing with associated tachycardia and tachypnea. Concern for DVT/PE. Plan  CBC, CMP, duplex LLE, CTa and analgesia. Deena MOULTON [place], [place]      ED Course       Procedures   Progress note    Labs and imaging reviewed. Deena MOULTON [place], [place]        CONSULT NOTE:  10:11 Bhumika Figueroa MD spoke with Dr. Clint Leventhal, Consult for Hospitalist. Discussed available diagnostic tests and clinical findings. Provider is in agreement with care plans as outlined. Dr. Clint Leventhal will see and admit the patient.

## 2017-06-06 NOTE — IP AVS SNAPSHOT
[address] 
[phone] Patient: Anais Calhoun MRN: [id number] You are allergic to the following Allergen Reactions Penicillin G Anaphylaxis Recent Documentation Height Weight BMI OB Status Smoking Status 1.753 m 86.5 kg 28.16 kg/m2 Having regular periods Never Smoker Unresulted Labs Order Current Status CULTURE, BLOOD, PAIRED In process Emergency Contacts Name Discharge Info Relation Home Work Mobile Reena Espinal  Mother [14] [phone] About your hospitalization You were admitted on:  June 6, 2017 You last received care in the:  [facility] 4 IM 2 You were discharged on:  June 9, 2017 Unit phone number:  [phone] Why you were hospitalized Your primary diagnosis was:  Pulmonary Embolism (Hcc) Your diagnoses also included: Tachycardia, Acute Deep Vein Thrombosis (Dvt) Of Left Lower Extremity (Hcc) Providers Seen During Your Hospitalizations Provider Role Specialty Primary office phone Lg Carlos MD Attending Provider Emergency Medicine [phone] Matthieu Juarez MD Attending Provider [facility] [phone] Bc Patiño MD Attending Provider Internal Medicine [phone] Your Primary Care Physician (PCP) Primary Care Physician Office Phone Office Fax David Winter [phone] Follow-up Information Follow up With Details Comments Contact Info Kelsy Lock MD On 6/14/2017 Discharge follow up [facility] follow up PCP appointment on Wednesday, 6/14/17 @ 10:20 a.m. [facility] [address] [place] [address] [place] [id number] 
[phone] Current Discharge Medication List  
  
START taking these medications Dose & Instructions Dispensing Information Comments Morning Noon Evening Bedtime * apixaban 5 mg tablet Commonly known as:  Elta Cheek Your last dose was: Your next dose is: Take 2 Tabs BID X 7 days, then 1 tab BID X 23 days. Quantity:  74 Tab Refills:  0  
     
   
   
   
  
 * apixaban 5 mg tablet Commonly known as:  Elta Cheek Your last dose was: Your next dose is:    
   
   
 Dose:  5 mg Take 1 Tab by mouth two (2) times a day for 180 days. Quantity:  60 Tab Refills:  4  
     
   
   
   
  
 famotidine 20 mg tablet Commonly known as:  PEPCID Your last dose was: Your next dose is:    
   
   
 Dose:  20 mg Take 1 Tab by mouth two (2) times a day. Quantity:  20 Tab Refills:  0  
     
   
   
   
  
 naproxen 500 mg tablet Commonly known as:  NAPROSYN Your last dose was: Your next dose is:    
   
   
 Dose:  500 mg Take 1 Tab by mouth two (2) times daily (with meals). Quantity:  6 Tab Refills:  0  
     
   
   
   
  
 polyethylene glycol 17 gram packet Commonly known as:  Chaneta [place] Your last dose was: Your next dose is:    
   
   
 Dose:  17 g Take 1 Packet by mouth daily. Quantity:  30 Packet Refills:  0  
     
   
   
   
  
 senna-docusate 8.6-50 mg per tablet Commonly known as:  Devoria Pal Your last dose was: Your next dose is:    
   
   
 Dose:  2 Tab Take 2 Tabs by mouth two (2) times a day. Quantity:  20 Tab Refills:  0  
     
   
   
   
  
 * Notice: This list has 2 medication(s) that are the same as other medications prescribed for you. Read the directions carefully, and ask your doctor or other care provider to review them with you. CONTINUE these medications which have NOT CHANGED Dose & Instructions Dispensing Information Comments Morning Noon Evening Bedtime Biotin 2,500 mcg Cap Your last dose was: Your next dose is:    
   
   
 Dose:  1 Can Take 1 Can by mouth daily. Refills:  0 escitalopram oxalate 20 mg tablet Commonly known as:  Fabi Pandey Your last dose was: Your next dose is:    
   
   
 Dose:  20 mg Take 20 mg by mouth nightly. Refills:  0 HYDROcodone-acetaminophen 5-325 mg per tablet Commonly known as:  Luis Harding Your last dose was: Your next dose is:    
   
   
 Dose:  1 Tab Take 1 Tab by mouth every four (4) hours as needed for Pain. Max Daily Amount: 6 Tabs. Quantity:  12 Tab Refills:  0  
     
   
   
   
  
 * levothyroxine 112 mcg tablet Commonly known as:  SYNTHROID Your last dose was: Your next dose is:    
   
   
 Dose:  112 mcg Take 112 mcg by mouth Daily (before breakfast). Takes 112+12.5+50 Refills:  0  
     
   
   
   
  
 * levothyroxine 25 mcg tablet Commonly known as:  SYNTHROID Your last dose was: Your next dose is:    
   
   
 Dose:  12.5 mcg Take 12.5 mcg by mouth Daily (before breakfast). Takes 112+12.5+50 Refills:  0  
     
   
   
   
  
 * levothyroxine 50 mcg tablet Commonly known as:  SYNTHROID Your last dose was: Your next dose is:    
   
   
 Dose:  50 mcg Take 50 mcg by mouth Daily (before breakfast). Takes 112+12.5+50 Refills:  0  
     
   
   
   
  
 venlafaxine- mg capsule Commonly known as:  EFFEXOR-XR Your last dose was: Your next dose is:    
   
   
 Dose:  150 mg Take 150 mg by mouth daily. Refills:  0  
     
   
   
   
  
 * Notice: This list has 3 medication(s) that are the same as other medications prescribed for you. Read the directions carefully, and ask your doctor or other care provider to review them with you. STOP taking these medications CAMRESE LO 0.10 mg-20 mcg (84)/10 mcg (7) 3mpk Generic drug:  L-Norgest&E Estradiol-E Estrad  
   
  
 cyclobenzaprine 5 mg tablet Commonly known as:  FLEXERIL  
   
  
 ibuprofen 600 mg tablet Commonly known as:  MOTRIN  
   
 Where to Get Your Medications Information on where to get these meds will be given to you by the nurse or doctor. ! Ask your nurse or doctor about these medications  
  apixaban 5 mg tablet  
 apixaban 5 mg tablet  
 famotidine 20 mg tablet HYDROcodone-acetaminophen 5-325 mg per tablet  
 naproxen 500 mg tablet  
 polyethylene glycol 17 gram packet  
 senna-docusate 8.6-50 mg per tablet Discharge Instructions Please bring this form with you to show your primary care provider at your follow-up appointment. Primary care provider:  Dr. Tennille Kang MD 
 
Discharging provider:  Debra Carrion MD 
 
You have been admitted to the hospital with the following diagnoses: 
· Pulmonary embolism ([facility] [address] · Acute deep vein thrombosis (DVT) of left lower extremity ([facility] [address] · Tachycardia FOLLOW-UP CARE RECOMMENDATIONS: 
 
APPOINTMENTS: 
Follow-up Information Follow up With Details Comments Contact Info Tennille Kang MD In 1 week Discharge follow up  [address] [place] [address] 
[phone] FOLLOW-UP TESTS recommended:  
- you will be taking blood thinners for 6 months 
- no driving or operating machinery while on pain medication PENDING TEST RESULTS: 
At the time of your discharge the following test results are still pending: none Please make sure you review these results with your outpatient follow-up provider(s). SYMPTOMS to watch for: chest pain, shortness of breath, fever, chills, nausea, vomiting, diarrhea, change in mentation, falling, weakness, bleeding. DIET/what to eat:  Regular Diet ACTIVITY:  Activity as tolerated WOUND CARE: none EQUIPMENT needed:  none What to do if new or unexpected symptoms occur? If you experience any of the above symptoms (or should other concerns or questions arise after discharge) please call your primary care physician. Return to the emergency room if you cannot get hold of your doctor. · It is very important that you keep your follow-up appointment(s). · Please bring discharge papers, medication list (and/or medication bottles) to your follow-up appointments for review by your outpatient provider(s). · Please check the list of medications and be sure it includes every medication (even non-prescription medications) that your provider wants you to take. · It is important that you take the medication exactly as they are prescribed. · Keep your medication in the bottles provided by the pharmacist and keep a list of the medication names, dosages, and times to be taken in your wallet. · Do not take other medications without consulting your doctor. · If you have any questions about your medications or other instructions, please talk to your nurse or care provider before you leave the hospital. 
 
I understand that if any problems occur once I am at home I am to contact my physician. These instructions were explained to me and I had the opportunity to ask questions. Discharge Orders None RipCode Announcement We are excited to announce that we are making your provider's discharge notes available to you in RipCode. You will see these notes when they are completed and signed by the physician that discharged you from your recent hospital stay. If you have any questions or concerns about any information you see in RipCode, please call the Health Information Department where you were seen or reach out to your Primary Care Provider for more information about your plan of care. Introducing \[facility]\"" & HEALTH SERVICES! Fernandez Harvey introduces RipCode patient portal. Now you can access parts of your medical record, email your doctor's office, and request medication refills online. 1. In your internet browser, go to [email or web address] 2. Click on the First Time User? Click Here link in the Sign In box. You will see the New Member Sign Up page. 3. Enter your ZoeMob Access Code exactly as it appears below. You will not need to use this code after youve completed the sign-up process. If you do not sign up before the expiration date, you must request a new code. · ZoeMob Access Code: [email or web address] Expires: 9/2/2017 11:04 PM 
 
4. Enter the last four digits of your Social Security Number (xxxx) and Date of Birth (mm/dd/yyyy) as indicated and click Submit. You will be taken to the next sign-up page. 5. Create a ZoeMob ID. This will be your ZoeMob login ID and cannot be changed, so think of one that is secure and easy to remember. 6. Create a ZoeMob password. You can change your password at any time. 7. Enter your Password Reset Question and Answer. This can be used at a later time if you forget your password. 8. Enter your e-mail address. You will receive e-mail notification when new information is available in [address] 9. Click Sign Up. You can now view and download portions of your medical record. 10. Click the Download Summary menu link to download a portable copy of your medical information. If you have questions, please visit the Frequently Asked Questions section of the ZoeMob website. Remember, ZoeMob is NOT to be used for urgent needs. For medical emergencies, dial 911. Now available from your iPhone and Android! General Information Please provide this summary of care documentation to your next provider. Patient Signature:  ____________________________________________________________ Date:  ____________________________________________________________  
  
[facility] Provider Signature:  ____________________________________________________________ Date:  ____________________________________________________________

## 2017-06-06 NOTE — PROCEDURES
[address]  *** FINAL REPORT ***    Name: Morgan Herrera  MRN: [id number]  : 03 Aug 1970  HIS Order #: [id number]  [id number] [facility] Visit #: [id number]  Date: 2017    TYPE OF TEST: Peripheral Venous Testing    REASON FOR TEST  Pain in limb, Limb swelling    Left Leg:-  Deep venous thrombosis:           Yes  Proximal extent of thrombus:      Common Femoral  Superficial venous thrombosis:    Yes  Deep venous insufficiency:        Not examined  Superficial venous insufficiency: Not examined      INTERPRETATION/FINDINGS  PROCEDURE:  Color duplex ultrasound imaging of lower extremity veins. FINDINGS:       Right: The common femoral vein is patent and without evidence of  thrombus. Phasic flow is observed. This extremity was not otherwise  evaluated. Left:   Consistent with thrombosis involving the common femoral  and greater saphenous vein as demonstrated by vein  non-compressibility, and by a narrowing or occlusion of the flow  channel on color Doppler imaging. The deep femoral, femoral,  popliteal, posterior tibial, peroneal, and great saphenous (below  knee) are patent and without evidence of thrombus;  each is fully  compressible and there is no narrowing of the flow channel on color  Doppler imaging. The superficial greater saphenous thrombus extends  from groin to mid thigh before becoming patent. The thrombus appears  to enter the common femoral vein. Phasic flow is observed in the  common femoral vein. IMPRESSION:  There is evidence of vein thrombosis, as described above. The ultrasound appearance is more consistent with an acute than a  chronic process. Left leg images inadvertently labeled as right. ADDITIONAL COMMENTS    I have personally reviewed the data relevant to the interpretation of  this  study.     TECHNOLOGIST: Jose Webb RVT  Signed: 2017 07:12 PM    PHYSICIAN: Williams Jose MD  Signed: 2017 07:07 AM

## 2017-06-06 NOTE — UC NOTE
Pt going to [facility] ED for further evaluation via POV.  Report given to Janneth Walters

## 2017-06-07 LAB
ANION GAP BLD CALC-SCNC: 16 MMOL/L (ref 5–15)
ANION GAP BLD CALC-SCNC: 17 MMOL/L (ref 5–15)
ATRIAL RATE: 96 BPM
BASOPHILS # BLD AUTO: 0 K/UL (ref 0–0.1)
BASOPHILS # BLD: 0 % (ref 0–1)
BUN SERPL-MCNC: 7 MG/DL (ref 6–20)
BUN SERPL-MCNC: 7 MG/DL (ref 6–20)
BUN/CREAT SERPL: 16 (ref 12–20)
BUN/CREAT SERPL: 17 (ref 12–20)
CALCIUM SERPL-MCNC: 7.9 MG/DL (ref 8.5–10.1)
CALCIUM SERPL-MCNC: 7.9 MG/DL (ref 8.5–10.1)
CALCULATED P AXIS, ECG09: 63 DEGREES
CALCULATED R AXIS, ECG10: 34 DEGREES
CALCULATED T AXIS, ECG11: 50 DEGREES
CHLORIDE SERPL-SCNC: 105 MMOL/L (ref 97–108)
CHLORIDE SERPL-SCNC: 105 MMOL/L (ref 97–108)
CHOLEST SERPL-MCNC: 177 MG/DL
CO2 SERPL-SCNC: 15 MMOL/L (ref 21–32)
CO2 SERPL-SCNC: 16 MMOL/L (ref 21–32)
CREAT SERPL-MCNC: 0.41 MG/DL (ref 0.55–1.02)
CREAT SERPL-MCNC: 0.45 MG/DL (ref 0.55–1.02)
DIAGNOSIS, 93000: NORMAL
EOSINOPHIL # BLD: 0.3 K/UL (ref 0–0.4)
EOSINOPHIL NFR BLD: 3 % (ref 0–7)
ERYTHROCYTE [DISTWIDTH] IN BLOOD BY AUTOMATED COUNT: 13.4 % (ref 11.5–14.5)
GLUCOSE SERPL-MCNC: 78 MG/DL (ref 65–100)
GLUCOSE SERPL-MCNC: 80 MG/DL (ref 65–100)
HCT VFR BLD AUTO: 33.4 % (ref 35–47)
HDLC SERPL-MCNC: 39 MG/DL
HDLC SERPL: 4.5 {RATIO} (ref 0–5)
HGB BLD-MCNC: 11.3 G/DL (ref 11.5–16)
LACTATE SERPL-SCNC: 0.5 MMOL/L (ref 0.4–2)
LDLC SERPL CALC-MCNC: 107.4 MG/DL (ref 0–100)
LIPID PROFILE,FLP: ABNORMAL
LYMPHOCYTES # BLD AUTO: 14 % (ref 12–49)
LYMPHOCYTES # BLD: 1.5 K/UL (ref 0.8–3.5)
MCH RBC QN AUTO: 27.4 PG (ref 26–34)
MCHC RBC AUTO-ENTMCNC: 33.8 G/DL (ref 30–36.5)
MCV RBC AUTO: 81.1 FL (ref 80–99)
MONOCYTES # BLD: 0.9 K/UL (ref 0–1)
MONOCYTES NFR BLD AUTO: 9 % (ref 5–13)
NEUTS SEG # BLD: 7.8 K/UL (ref 1.8–8)
NEUTS SEG NFR BLD AUTO: 74 % (ref 32–75)
P-R INTERVAL, ECG05: 152 MS
PLATELET # BLD AUTO: 304 K/UL (ref 150–400)
POTASSIUM SERPL-SCNC: 3.6 MMOL/L (ref 3.5–5.1)
POTASSIUM SERPL-SCNC: 3.7 MMOL/L (ref 3.5–5.1)
Q-T INTERVAL, ECG07: 356 MS
QRS DURATION, ECG06: 74 MS
QTC CALCULATION (BEZET), ECG08: 449 MS
RBC # BLD AUTO: 4.12 M/UL (ref 3.8–5.2)
SODIUM SERPL-SCNC: 137 MMOL/L (ref 136–145)
SODIUM SERPL-SCNC: 137 MMOL/L (ref 136–145)
TRIGL SERPL-MCNC: 153 MG/DL (ref ?–150)
VENTRICULAR RATE, ECG03: 96 BPM
VLDLC SERPL CALC-MCNC: 30.6 MG/DL
WBC # BLD AUTO: 10.6 K/UL (ref 3.6–11)

## 2017-06-07 PROCEDURE — 36415 COLL VENOUS BLD VENIPUNCTURE: CPT | Performed by: FAMILY MEDICINE

## 2017-06-07 PROCEDURE — 83605 ASSAY OF LACTIC ACID: CPT | Performed by: FAMILY MEDICINE

## 2017-06-07 PROCEDURE — 80048 BASIC METABOLIC PNL TOTAL CA: CPT | Performed by: NURSE PRACTITIONER

## 2017-06-07 PROCEDURE — 93306 TTE W/DOPPLER COMPLETE: CPT

## 2017-06-07 PROCEDURE — 74011000250 HC RX REV CODE- 250: Performed by: HOSPITALIST

## 2017-06-07 PROCEDURE — 85025 COMPLETE CBC W/AUTO DIFF WBC: CPT | Performed by: FAMILY MEDICINE

## 2017-06-07 PROCEDURE — 80048 BASIC METABOLIC PNL TOTAL CA: CPT | Performed by: FAMILY MEDICINE

## 2017-06-07 PROCEDURE — 65660000000 HC RM CCU STEPDOWN

## 2017-06-07 PROCEDURE — 74011250637 HC RX REV CODE- 250/637: Performed by: FAMILY MEDICINE

## 2017-06-07 PROCEDURE — 74011000258 HC RX REV CODE- 258: Performed by: HOSPITALIST

## 2017-06-07 PROCEDURE — 74011250637 HC RX REV CODE- 250/637: Performed by: HOSPITALIST

## 2017-06-07 PROCEDURE — 80061 LIPID PANEL: CPT | Performed by: FAMILY MEDICINE

## 2017-06-07 PROCEDURE — 74011250636 HC RX REV CODE- 250/636: Performed by: FAMILY MEDICINE

## 2017-06-07 RX ORDER — LEVOTHYROXINE SODIUM 25 UG/1
25 TABLET ORAL
Status: DISCONTINUED | OUTPATIENT
Start: 2017-06-07 | End: 2017-06-07

## 2017-06-07 RX ORDER — LEVOTHYROXINE SODIUM 112 UG/1
112 TABLET ORAL
Status: DISCONTINUED | OUTPATIENT
Start: 2017-06-07 | End: 2017-06-07

## 2017-06-07 RX ORDER — HYDROCODONE BITARTRATE AND ACETAMINOPHEN 10; 325 MG/1; MG/1
1 TABLET ORAL
Status: DISCONTINUED | OUTPATIENT
Start: 2017-06-07 | End: 2017-06-09 | Stop reason: HOSPADM

## 2017-06-07 RX ORDER — POTASSIUM CHLORIDE 750 MG/1
40 TABLET, FILM COATED, EXTENDED RELEASE ORAL
Status: COMPLETED | OUTPATIENT
Start: 2017-06-07 | End: 2017-06-07

## 2017-06-07 RX ORDER — POLYETHYLENE GLYCOL 3350 17 G/17G
17 POWDER, FOR SOLUTION ORAL DAILY
Status: DISCONTINUED | OUTPATIENT
Start: 2017-06-07 | End: 2017-06-09 | Stop reason: HOSPADM

## 2017-06-07 RX ORDER — AMOXICILLIN 250 MG
1 CAPSULE ORAL 2 TIMES DAILY
Status: DISCONTINUED | OUTPATIENT
Start: 2017-06-07 | End: 2017-06-09 | Stop reason: HOSPADM

## 2017-06-07 RX ORDER — SODIUM CHLORIDE 9 MG/ML
125 INJECTION, SOLUTION INTRAVENOUS CONTINUOUS
Status: DISCONTINUED | OUTPATIENT
Start: 2017-06-07 | End: 2017-06-07

## 2017-06-07 RX ORDER — LEVOTHYROXINE SODIUM 50 UG/1
50 TABLET ORAL
Status: DISCONTINUED | OUTPATIENT
Start: 2017-06-07 | End: 2017-06-07

## 2017-06-07 RX ADMIN — HYDROCODONE BITARTRATE AND ACETAMINOPHEN 1 TABLET: 10; 325 TABLET ORAL at 18:30

## 2017-06-07 RX ADMIN — HYDROCODONE BITARTRATE AND ACETAMINOPHEN 1 TABLET: 10; 325 TABLET ORAL at 10:12

## 2017-06-07 RX ADMIN — POTASSIUM CHLORIDE 40 MEQ: 750 TABLET, FILM COATED, EXTENDED RELEASE ORAL at 01:17

## 2017-06-07 RX ADMIN — ENOXAPARIN SODIUM 80 MG: 80 INJECTION SUBCUTANEOUS at 08:44

## 2017-06-07 RX ADMIN — Medication: at 10:16

## 2017-06-07 RX ADMIN — SODIUM CHLORIDE 1000 ML: 900 INJECTION, SOLUTION INTRAVENOUS at 01:17

## 2017-06-07 RX ADMIN — ESCITALOPRAM OXALATE 20 MG: 10 TABLET ORAL at 20:42

## 2017-06-07 RX ADMIN — VENLAFAXINE HYDROCHLORIDE 150 MG: 150 CAPSULE, EXTENDED RELEASE ORAL at 08:44

## 2017-06-07 RX ADMIN — Medication 10 ML: at 06:04

## 2017-06-07 RX ADMIN — HYDROCODONE BITARTRATE AND ACETAMINOPHEN 1 TABLET: 5; 325 TABLET ORAL at 06:02

## 2017-06-07 RX ADMIN — Medication: at 19:17

## 2017-06-07 RX ADMIN — Medication 10 ML: at 22:14

## 2017-06-07 RX ADMIN — LEVOTHYROXINE SODIUM 175 MCG: 150 TABLET ORAL at 12:17

## 2017-06-07 RX ADMIN — DOCUSATE SODIUM AND SENNOSIDES 1 TABLET: 8.6; 5 TABLET, FILM COATED ORAL at 17:53

## 2017-06-07 RX ADMIN — Medication: at 19:19

## 2017-06-07 RX ADMIN — ENOXAPARIN SODIUM 80 MG: 80 INJECTION SUBCUTANEOUS at 20:42

## 2017-06-07 RX ADMIN — SODIUM CHLORIDE 125 ML/HR: 900 INJECTION, SOLUTION INTRAVENOUS at 02:16

## 2017-06-07 RX ADMIN — POLYETHYLENE GLYCOL 3350 17 G: 17 POWDER, FOR SOLUTION ORAL at 10:12

## 2017-06-07 RX ADMIN — DOCUSATE SODIUM AND SENNOSIDES 1 TABLET: 8.6; 5 TABLET, FILM COATED ORAL at 10:12

## 2017-06-07 NOTE — PROGRESS NOTES
Admission Medication Reconciliation:    Information obtained from: patient, Rx Query    Significant PMH/Disease States:   Past Medical History:   Diagnosis Date    Endocrine disease     Hypothyroid    Neurological disorder     Neurocardiogenic syncope       Chief Complaint for this Admission:    Chief Complaint   Patient presents with    Leg Swelling       Allergies:  Penicillin g    Prior to Admission Medications:   Prior to Admission Medications   Prescriptions Last Dose Informant Patient Reported? Taking? Biotin 2,500 mcg cap   Yes Yes   Sig: Take 1 Can by mouth daily. HYDROcodone-acetaminophen (NORCO) 5-325 mg per tablet   No Yes   Sig: Take 1 Tab by mouth every four (4) hours as needed for Pain. Max Daily Amount: 6 Tabs. L-Norgest&E Estradiol-E Estrad (CAMRESE LO) 0.10 mg-20 mcg (84)/10 mcg (7) 3MPk   Yes Yes   Sig: Take 1 Tab by mouth daily. cyclobenzaprine (FLEXERIL) 5 mg tablet 6/5/2017  No Yes   Sig: Take 1 Tab by mouth three (3) times daily (with meals) for 5 days. escitalopram oxalate (LEXAPRO) 20 mg tablet 6/5/2017 at hs  Yes Yes   Sig: Take 20 mg by mouth nightly. ibuprofen (MOTRIN) 600 mg tablet 6/6/2017 at 1400  No Yes   Sig: Take 1 Tab by mouth every six (6) hours as needed for Pain.   levothyroxine (SYNTHROID) 112 mcg tablet 6/6/2017 at am  Yes Yes   Sig: Take 112 mcg by mouth Daily (before breakfast). Takes 863+06+69   levothyroxine (SYNTHROID) 25 mcg tablet 6/6/2017 at am  Yes Yes   Sig: Take 25 mcg by mouth Daily (before breakfast). Takes 112+25+50   levothyroxine (SYNTHROID) 50 mcg tablet   Yes Yes   Sig: Take 50 mcg by mouth Daily (before breakfast). Takes 112+67+75   venlafaxine-SR (EFFEXOR-XR) 150 mg capsule 6/6/2017 at am  Yes Yes   Sig: Take 150 mg by mouth daily. Facility-Administered Medications: None         Comments/Recommendations: Reviewed PTA meds with patient. Updated with following changes:  1) Added: escitalopram, venlafaxine, Camrese Lo, and levothyroxine.  She takes 112 mcg + 25 mcg + 50 mcg for a total dose of 187 mcg of levothyroxine.

## 2017-06-07 NOTE — PROGRESS NOTES
Problem: Pulmonary Embolism Care Plan (Adult)  Goal: *Improvement of existing pulmonary embolism  Outcome: Progressing Towards Goal  Receiving Lovenox Q12H for management of PE.

## 2017-06-07 NOTE — H&P
[facility] [address]   HISTORY AND PHYSICAL       Name:  Carola Sr   MR#:  [id number]   :  1970   Account #:  [de-identified]        Date of Adm:  2017       CHIEF COMPLAINT: Back pain and left thigh pain. HISTORY OF PRESENT ILLNESS: A 51-year-old white female with   past medical history of hypothyroidism, neurocardiogenic syncope,   presented to the emergency department with chief complaint of left-  sided lower back pain, left thigh pain. Symptoms reportedly started   approximately 3 days ago. She notably lives in [place]. She is here   in [place] following a week long conference. She reportedly had lifted   a suitcase a few days ago and started having some pain in the left side   of her lower back and left buttock, left upper thigh region. The pain was   notably constant, aggravated with movement, present at rest,   moderate to severe without specific alleviating factors, dull aching. The   patient was seen in the ED on 2017 with same symptoms, had a   workup including a left hip and lumbar x-ray, which were negative for   any acute process. The patient was thought to have muscular injury,   was prescribed Flexeril, ibuprofen and Norco. The patient was   discharged home. She notes she continued to have symptoms and   notes she took some of the medicines, albeit she had not taken Norco   secondary to her work. As the symptoms did not resolve, she decided   to come back to the hospital in the evening. On arrival in the   emergency department, initial recorded vital signs were blood pressure   151/81, heart rate 131, respiratory rate 22, O2 saturation 99% on room   air. She had elevated labs showing WBC of 12.5, anion gap of 16,   decreased potassium 3.2, serum CO2 of 20. CTA of the chest with IV   contrast showed acute bilateral lower lobe pulmonary emboli.  Venous   duplex of bilateral lower extremities revealed DVT in the left common   femoral vein and greater saphenous veins. Per the ED, the patient was   started on Lovenox 1 mg/kg subcu x1 dose. She is now seen for   admission to the hospitalist team for evaluation and treatment. She   denies chest pain, shortness of breath. There is no reports of   dizziness, lightheadedness, focal weakness, numbness, paresthesias,   slurred speech, facial droop, headache, neck pain, chest pain,   abdominal pain, nausea, vomiting, diarrhea, melena, dysuria,   hematuria, fever or chills. She notes she has had recent constipation   after staring medications for her pain. She also reports having swelling   entire left lower extremity with increased pain. She denies any prior   history of DVT or PE. She notably is taking birth control pill. She   reports no family history of thromboembolism. PAST MEDICAL HISTORY: Hypothyroidism, neurocardiogenic   syncope. PAST SURGICAL HISTORY: Nose surgery - age 11. ORIF left ankle   fracture. MEDICATIONS    1. Levothyroxine 12.5 mcg p.o. daily. 2. Levothyroxine 112 mcg p.o. daily. 3. Levothyroxine 50 mcg p.o. daily. 4. Biotin 2500 mcg one capsule p.o. daily. 5. Flexeril 5 mg p.o. t.i.d.   6. Lexapro 20 mg p.o. at bedtime. 7. Norco 5/325 mg one tablet p.o. q.4h. p.r.n.   8. Ibuprofen 600 mg p.o. q.6h. p.r.n.    9. Camrese Lo 0.1 mg-20 mcg-12 mcg, one tablet p.o. daily. 10. Effexor- mg p.o. daily. ALLERGIES: PENICILLIN G.    SOCIAL HISTORY: Denies smoking and illicit drugs. Positive for rare   alcohol. . FAMILY HISTORY: Stroke - father. Hypertension - father. REVIEW OF SYSTEMS: Eleven systems reviewed, pertinent positives   as HPI, otherwise negative. PHYSICAL EXAMINATION   VITAL SIGNS: Temperature 98.6 degrees Fahrenheit, blood pressure   123/70, heart rate 115, respiratory rate 14, O2 saturation 99% on room   air, recorded weight 184 pounds (83.6 kg), recorded height is 5 feet 9   inches tall.    GENERAL: The patient is in no acute respiratory distress. PSYCHIATRIC: The patient is awake, alert, oriented x3. NEUROLOGIC: GCS of 15. Moves extremities x4. Sensation is grossly   intact without slurred speech or facial droop. HEENT: Normocephalic, atraumatic. PERRLA. EOM's intact. Sclerae   anicteric. Conjunctivae are clear. Nares are patent. Oropharynx clear. Tongue is midline, not edematous. NECK: Supple without   lymphadenopathy, JVD, carotid bruits or thyromegaly. LYMPH: Negative for cervical or supraclavicular adenopathy. LUNGS: Clear to auscultation bilaterally. CARDIOVASCULAR: Heart, tachycardiac, regular rhythm. No   murmurs, rubs or gallops. GI: Abdomen soft, nontender, nondistended. Normoactive bowel   sounds. No guarding or rigidity. No auscultated abdominal bruits. No   pulsatile mass. BACK: No CVA tenderness. No step-offs. MUSCULOSKELETAL: Tenderness to palpation of the left thigh and   hip, left leg and calf region with noted edema. Negative for right calf   tenderness. VASCULAR: 2+ radial, 1+ dorsalis pedis pulses without cyanosis or   clubbing. Non-pitting edema to the left thigh to the left leg. SKIN: Warm and dry. LABORATORY DATA: Review as follows: Sodium 136, potassium 3.2,   chloride 100, CO2 20, creatinine 0.5, BUN of 10, glucose 77. Calcium 9.3. Total bilirubin 0.5, total protein 8.4. Albumin 3.8, ALT 23, AST 12, alkaline phosphatase 81. Troponin I less   than 0.04. WBC 12.5, hemoglobin 13.5, hematocrit 39.0, platelets are   094, neutrophils 75%. INR 1.0, PT of 10.4, PTT 28.1. CTA of the chest   with and without contrast reveals acute bilateral lower lobe pulmonary   artery embolism. A 12-lead EKG: Normal sinus rhythm, 96 beats a   minute. Venous duplex bilateral lower extremities, results noted in HPI. IMPRESSION AND PLAN    1. Acute bilateral lower lobe pulmonary emboli. Admit the patient to   telemetry. Place on Lovenox 1 mg/kg subcutaneous q.12h.  Order two   echocardiograms in a.m. to evaluate for any possible thrombus. Monitor closely pulse oximetry and telemetry. 2. Left lower extremity deep venous thrombosis. Plan as noted above. Advise considering alternative conception in light of current PE and DVT and increased of thromboembolus on OCPs. 3. Left lower extremity pain, thigh pain. Provide pain management. 4. Constipation. Order stool softener. 5. Tachycardia. Continue telemetry. 6. Leukocytosis. Repeat CBC. No defined source of infection. 7. Hypokalemia. We will order potassium chloride 40 mEq p.o. x1   dose. Repeat potassium levels post-placement. 8. Anion gap acidosis with elevated anion gap and decreased serum   CO2. Ordered 0.9% normal saline 1 liter IV fluid bolus followed by IV fluid   maintenance. Check lactic acid level. 9. Left lower extremity edema. Continue workup as noted. 10. Hypothyroidism. I reviewed with the patient medication including   Levothyroxine as well as the dosage that she confirmed. Make note of   corrections. 11. Deep venous thrombosis prophylaxis. The patient is on   anticoagulation at this time with Lovenox. BRENDAN Hall MD      MP / CD   D:  06/07/2017   02:25   T:  06/07/2017   06:28   Job #:  [id number]

## 2017-06-07 NOTE — PROGRESS NOTES
Hospitalist Progress Note  Neel Ballard MD  Office: [phone]  Cell: [phone]      Date of Service:  2017  NAME:  Jag Reid  :  1970  MRN:  [id number]      Admission Summary:   43 yo female with PMhx of Hypothyroidism, Neurocardiogenic syncope admitted for low back pain after moving luggage, followed by Leg leg pain which was worsening. Interval history / Subjective:     Pt seen and examined  C/o left leg pain   No chest pain, sob, n/v, HA, dizziness. Assessment & Plan:     1. Acute B/L PE and LLE DVT  - possibly related to OCP  - d/c OCP  - Lovenox BID, discussed OAC  - will start when decides  - pain control  - ambulate    2. AG-Metabolic Acidosis  - change IVF to Bicarb gtt    3. Hypothyroidism   - c/w home meds      Code status: FULL  DVT prophylaxis: Lovenox     Care Plan discussed with: Patient/Family and Nurse  Disposition: Home w/Family     Hospital Problems  Date Reviewed: 2017          Codes Class Noted POA    * (Principal)Pulmonary embolism ([facility] ICD-10-CM: I26.99  ICD-9-CM: 415.19  2017 Unknown        Tachycardia ICD-10-CM: R00.0  ICD-9-CM: 785.0  2017 Unknown        Acute deep vein thrombosis (DVT) of left lower extremity ([facility] ICD-10-CM: G28.979  ICD-9-CM: 453.40  2017 Unknown                Review of Systems:   A comprehensive review of systems was negative except for that written in the HPI. Vital Signs:    Last 24hrs VS reviewed since prior progress note.  Most recent are:  Visit Vitals    /63 (BP 1 Location: Right arm, BP Patient Position: At rest)    Pulse (!) 103    Temp 98.1 °F (36.7 °C)    Resp 18    Ht 5' 9\" (1.753 m)    Wt 84.3 kg (185 lb 13.6 oz)    SpO2 98%    BMI 27.44 kg/m2         Intake/Output Summary (Last 24 hours) at 17 1032  Last data filed at 17 0400   Gross per 24 hour   Intake           216.67 ml   Output                0 ml   Net 216.67 ml        Physical Examination:             Constitutional:  No acute distress, cooperative, pleasant    ENT:  Oral mucous moist, oropharynx benign. Neck supple,    Resp:  CTA bilaterally. CV:  Regular rhythm, normal rate,     GI:  Soft, non distended, non tender. normoactive bowel sounds,     Musculoskeletal:  Lt thigh swelling and tenderness,  +Calf tenderness    Neurologic:  Moves all extremities. AAOx3     Eyes:  EOMI. Anicteric sclerae, PERRL. Data Review:    Review and/or order of clinical lab test  Review and/or order of tests in the radiology section of [facility]  Review and/or order of tests in the medicine section of [facility]      Labs:     Recent Labs      06/07/17   0446  06/06/17 1958   WBC  10.6  12.5*   HGB  11.3*  13.5   HCT  33.4*  39.0   PLT  304  364     Recent Labs      06/07/17   0555  06/07/17 0446  06/06/17 1958   NA  137  137  136   K  3.7  3.6  3.2*   CL  105  105  100   CO2  16*  15*  20*   BUN  7  7  10   CREA  0.45*  0.41*  0.55   GLU  80  78  77   CA  7.9*  7.9*  9.3     Recent Labs      06/06/17 1958   SGOT  12*   ALT  23   AP  81   TBILI  0.5   TP  8.4*   ALB  3.8   GLOB  4.6*     Recent Labs      06/06/17 1958   INR  1.0   PTP  10.4   APTT  28.1      No results for input(s): FE, TIBC, PSAT, FERR in the last 72 hours. No results found for: FOL, RBCF   No results for input(s): PH, PCO2, PO2 in the last 72 hours.   Recent Labs      06/06/17 1958   TROIQ  <0.04     Lab Results   Component Value Date/Time    Cholesterol, total 177 06/07/2017 04:46 AM    HDL Cholesterol 39 06/07/2017 04:46 AM    LDL, calculated 107.4 06/07/2017 04:46 AM    Triglyceride 153 06/07/2017 04:46 AM    CHOL/HDL Ratio 4.5 06/07/2017 04:46 AM     No results found for: GLUCPOC  No results found for: COLOR, APPRN, SPGRU, REFSG, DAJA, PROTU, GLUCU, KETU, BILU, UROU, MONICA, LEUKU, GLUKE, EPSU, BACTU, WBCU, RBCU, CASTS, UCRY      Medications Reviewed:     Current Facility-Administered Medications Medication Dose Route Frequency    sodium bicarbonate (8.4%) 75 mEq in 0.45% sodium chloride 1,000 mL infusion   IntraVENous CONTINUOUS    HYDROcodone-acetaminophen (NORCO)  mg tablet 1 Tab  1 Tab Oral Q6H PRN    senna-docusate (PERICOLACE) 8.6-50 mg per tablet 1 Tab  1 Tab Oral BID    polyethylene glycol (MIRALAX) packet 17 g  17 g Oral DAILY    escitalopram oxalate (LEXAPRO) tablet 20 mg  20 mg Oral QHS    venlafaxine-SR (EFFEXOR-XR) capsule 150 mg  150 mg Oral DAILY    sodium chloride (NS) flush 5-10 mL  5-10 mL IntraVENous Q8H    sodium chloride (NS) flush 5-10 mL  5-10 mL IntraVENous PRN    enoxaparin (LOVENOX) injection 80 mg  1 mg/kg SubCUTAneous Q12H     ______________________________________________________________________  EXPECTED LENGTH OF STAY: 3d 2h  ACTUAL LENGTH OF STAY:          1                 Clara Ferrari MD

## 2017-06-07 NOTE — ROUTINE PROCESS
TRANSFER - OUT REPORT:    Verbal report given to [facility] RN(name) on [employer]  being transferred to [facility]) for routine progression of care       Report consisted of patients Situation, Background, Assessment and   Recommendations(SBAR). Information from the following report(s) SBAR, Kardex, ED Summary and MAR was reviewed with the receiving nurse. Lines:   Peripheral IV 06/06/17 Left Antecubital (Active)   Site Assessment Clean, dry, & intact 6/6/2017  8:00 PM   Phlebitis Assessment 0 6/6/2017  8:00 PM   Infiltration Assessment 0 6/6/2017  8:00 PM   Dressing Status Clean, dry, & intact 6/6/2017  8:00 PM   Dressing Type Transparent 6/6/2017  8:00 PM   Hub Color/Line Status Pink;Flushed;Patent 6/6/2017  8:00 PM   Action Taken Blood drawn 6/6/2017  8:00 PM   Alcohol Cap Used No 6/6/2017  8:00 PM        Opportunity for questions and clarification was provided.       Patient transported with:   [employer]

## 2017-06-07 NOTE — PROGRESS NOTES
Received patient at [phone] [id number] [phone] from ED. Skin assessment completed with Paras Roberts RN. Noted left upper leg redness. No other skin problems noted. Complaint of 4/10 left leg pain. Norco given with some relief. Potassium repleated. NS bolus given and continuous IV fluid started. VSS. CO2 (venous) critical at 15. Jacques Palomo NP notified and labs redrawn. Redraw CO2=16. No orders received. Bedside shift change report given to Virgen Coffman RN (oncoming nurse) by Dax Grimaldo RN (offgoing nurse). Report included the following information SBAR, Kardex, ED Summary, Procedure Summary, Intake/Output, MAR, Med Rec Status and Cardiac Rhythm NSR.

## 2017-06-07 NOTE — PROGRESS NOTES
Received order for CBC. Pt already stuck 3 times to obtain BMP and refuses any more lab draws at this time.

## 2017-06-08 LAB
ANION GAP BLD CALC-SCNC: 12 MMOL/L (ref 5–15)
BUN SERPL-MCNC: 2 MG/DL (ref 6–20)
BUN/CREAT SERPL: 5 (ref 12–20)
CALCIUM SERPL-MCNC: 8.3 MG/DL (ref 8.5–10.1)
CHLORIDE SERPL-SCNC: 104 MMOL/L (ref 97–108)
CO2 SERPL-SCNC: 22 MMOL/L (ref 21–32)
CREAT SERPL-MCNC: 0.4 MG/DL (ref 0.55–1.02)
GLUCOSE SERPL-MCNC: 94 MG/DL (ref 65–100)
POTASSIUM SERPL-SCNC: 3.2 MMOL/L (ref 3.5–5.1)
SODIUM SERPL-SCNC: 138 MMOL/L (ref 136–145)

## 2017-06-08 PROCEDURE — 36415 COLL VENOUS BLD VENIPUNCTURE: CPT | Performed by: HOSPITALIST

## 2017-06-08 PROCEDURE — 74011000258 HC RX REV CODE- 258: Performed by: HOSPITALIST

## 2017-06-08 PROCEDURE — 80048 BASIC METABOLIC PNL TOTAL CA: CPT | Performed by: HOSPITALIST

## 2017-06-08 PROCEDURE — 65660000000 HC RM CCU STEPDOWN

## 2017-06-08 PROCEDURE — 74011250637 HC RX REV CODE- 250/637: Performed by: FAMILY MEDICINE

## 2017-06-08 PROCEDURE — 74011250636 HC RX REV CODE- 250/636: Performed by: FAMILY MEDICINE

## 2017-06-08 PROCEDURE — 74011250637 HC RX REV CODE- 250/637: Performed by: HOSPITALIST

## 2017-06-08 PROCEDURE — 74011250636 HC RX REV CODE- 250/636: Performed by: HOSPITALIST

## 2017-06-08 PROCEDURE — 74011000250 HC RX REV CODE- 250: Performed by: HOSPITALIST

## 2017-06-08 RX ORDER — POTASSIUM CHLORIDE 750 MG/1
40 TABLET, FILM COATED, EXTENDED RELEASE ORAL
Status: COMPLETED | OUTPATIENT
Start: 2017-06-08 | End: 2017-06-08

## 2017-06-08 RX ORDER — NAPROXEN 250 MG/1
500 TABLET ORAL 2 TIMES DAILY WITH MEALS
Status: DISCONTINUED | OUTPATIENT
Start: 2017-06-08 | End: 2017-06-09 | Stop reason: HOSPADM

## 2017-06-08 RX ORDER — FUROSEMIDE 10 MG/ML
40 INJECTION INTRAMUSCULAR; INTRAVENOUS ONCE
Status: COMPLETED | OUTPATIENT
Start: 2017-06-08 | End: 2017-06-08

## 2017-06-08 RX ADMIN — NAPROXEN 500 MG: 250 TABLET ORAL at 10:37

## 2017-06-08 RX ADMIN — HYDROCODONE BITARTRATE AND ACETAMINOPHEN 1 TABLET: 10; 325 TABLET ORAL at 12:45

## 2017-06-08 RX ADMIN — NAPROXEN 500 MG: 250 TABLET ORAL at 17:48

## 2017-06-08 RX ADMIN — Medication: at 04:48

## 2017-06-08 RX ADMIN — ENOXAPARIN SODIUM 80 MG: 80 INJECTION SUBCUTANEOUS at 08:45

## 2017-06-08 RX ADMIN — LEVOTHYROXINE SODIUM 175 MCG: 150 TABLET ORAL at 06:30

## 2017-06-08 RX ADMIN — ESCITALOPRAM OXALATE 20 MG: 10 TABLET ORAL at 20:22

## 2017-06-08 RX ADMIN — VENLAFAXINE HYDROCHLORIDE 150 MG: 150 CAPSULE, EXTENDED RELEASE ORAL at 08:45

## 2017-06-08 RX ADMIN — HYDROCODONE BITARTRATE AND ACETAMINOPHEN 1 TABLET: 10; 325 TABLET ORAL at 06:25

## 2017-06-08 RX ADMIN — POLYETHYLENE GLYCOL 3350 17 G: 17 POWDER, FOR SOLUTION ORAL at 08:45

## 2017-06-08 RX ADMIN — ENOXAPARIN SODIUM 80 MG: 80 INJECTION SUBCUTANEOUS at 20:22

## 2017-06-08 RX ADMIN — FUROSEMIDE 40 MG: 10 INJECTION, SOLUTION INTRAMUSCULAR; INTRAVENOUS at 10:37

## 2017-06-08 RX ADMIN — Medication 10 ML: at 21:59

## 2017-06-08 RX ADMIN — DOCUSATE SODIUM AND SENNOSIDES 1 TABLET: 8.6; 5 TABLET, FILM COATED ORAL at 08:45

## 2017-06-08 RX ADMIN — Medication 10 ML: at 06:26

## 2017-06-08 RX ADMIN — DOCUSATE SODIUM AND SENNOSIDES 1 TABLET: 8.6; 5 TABLET, FILM COATED ORAL at 17:48

## 2017-06-08 RX ADMIN — HYDROCODONE BITARTRATE AND ACETAMINOPHEN 1 TABLET: 10; 325 TABLET ORAL at 00:27

## 2017-06-08 RX ADMIN — POTASSIUM CHLORIDE 40 MEQ: 750 TABLET, FILM COATED, EXTENDED RELEASE ORAL at 10:37

## 2017-06-08 NOTE — PROGRESS NOTES
Hospitalist Progress Note  Jaron Lara MD  Office: [phone]  Cell: [phone]      Date of Service:  2017  NAME:  Maria Esther Trotter  :  1970  MRN:  [id number]      Admission Summary:   41 yo female with PMhx of Hypothyroidism, Neurocardiogenic syncope admitted for low back pain after moving luggage, followed by Leg leg pain which was worsening. Interval history / Subjective:     Pt seen and examined  C/o persistent left leg pain and swelling, unable to put shoes on     No chest pain, sob, n/v, HA, dizziness. Assessment & Plan:     1. Acute B/L PE and LLE DVT  - possibly related to OCP  - d/c OCP  - Lovenox BID, discussed OAC, will proceed with Eliquis   - pain control  - Lasix X 1 dose  - ambulate    2. AG-Metabolic Acidosis  - resolved    3. Hypothyroidism   - c/w home meds    4. Hypokalemia - replaced      Code status: FULL  DVT prophylaxis: Lovenox     Care Plan discussed with: Patient/Family and Nurse  Disposition: Home w/Family     Hospital Problems  Date Reviewed: 2017          Codes Class Noted POA    * (Principal)Pulmonary embolism ([facility] ICD-10-CM: I26.99  ICD-9-CM: 415.19  2017 Unknown        Tachycardia ICD-10-CM: R00.0  ICD-9-CM: 785.0  2017 Unknown        Acute deep vein thrombosis (DVT) of left lower extremity ([facility] ICD-10-CM: D62.758  ICD-9-CM: 453.40  2017 Unknown                Review of Systems:   A comprehensive review of systems was negative except for that written in the HPI. Vital Signs:    Last 24hrs VS reviewed since prior progress note.  Most recent are:  Visit Vitals    /71 (BP 1 Location: Right arm, BP Patient Position: At rest;Supine)    Pulse 92    Temp 98.5 °F (36.9 °C)    Resp 18    Ht 5' 9\" (1.753 m)    Wt 86.4 kg (190 lb 7.6 oz)    SpO2 99%    BMI 28.13 kg/m2         Intake/Output Summary (Last 24 hours) at 17 1403  Last data filed at 17 7570 Gross per 24 hour   Intake          2233.33 ml   Output                0 ml   Net          2233.33 ml        Physical Examination:             Constitutional:  No acute distress, cooperative, pleasant    ENT:  Oral mucous moist, oropharynx benign. Neck supple,    Resp:  CTA bilaterally. CV:  Regular rhythm, normal rate,     GI:  Soft, non distended, non tender. normoactive bowel sounds,     Musculoskeletal:  LLE swelling and tenderness at calf to upper thigh    Neurologic:  Moves all extremities. AAOx3     Eyes:  EOMI. Anicteric sclerae, PERRL. Data Review:    Review and/or order of clinical lab test  Review and/or order of tests in the radiology section of [facility]  Review and/or order of tests in the medicine section of [facility]      Labs:     Recent Labs      06/07/17   0446  06/06/17 1958   WBC  10.6  12.5*   HGB  11.3*  13.5   HCT  33.4*  39.0   PLT  304  364     Recent Labs      06/08/17   0351  06/07/17   0555  06/07/17   0446   NA  138  137  137   K  3.2*  3.7  3.6   CL  104  105  105   CO2  22  16*  15*   BUN  2*  7  7   CREA  0.40*  0.45*  0.41*   GLU  94  80  78   CA  8.3*  7.9*  7.9*     Recent Labs      06/06/17 1958   SGOT  12*   ALT  23   AP  81   TBILI  0.5   TP  8.4*   ALB  3.8   GLOB  4.6*     Recent Labs      06/06/17 1958   INR  1.0   PTP  10.4   APTT  28.1      No results for input(s): FE, TIBC, PSAT, FERR in the last 72 hours. No results found for: FOL, RBCF   No results for input(s): PH, PCO2, PO2 in the last 72 hours.   Recent Labs      06/06/17 1958   TROIQ  <0.04     Lab Results   Component Value Date/Time    Cholesterol, total 177 06/07/2017 04:46 AM    HDL Cholesterol 39 06/07/2017 04:46 AM    LDL, calculated 107.4 06/07/2017 04:46 AM    Triglyceride 153 06/07/2017 04:46 AM    CHOL/HDL Ratio 4.5 06/07/2017 04:46 AM     No results found for: GLUCPOC  No results found for: COLOR, APPRN, SPGRU, [address], DAJA, PROTU, GLUCU, Shelley Palumbo, BILU, Cheryl Jones, MONICA, Sinai Paz, [facility], EPSU, BACTU, WBCU, RBCU, JAYE BARFIELD      Medications Reviewed:     Current Facility-Administered Medications   Medication Dose Route Frequency    naproxen (NAPROSYN) tablet 500 mg  500 mg Oral BID WITH MEALS    HYDROcodone-acetaminophen (NORCO)  mg tablet 1 Tab  1 Tab Oral Q6H PRN    senna-docusate (PERICOLACE) 8.6-50 mg per tablet 1 Tab  1 Tab Oral BID    polyethylene glycol (MIRALAX) packet 17 g  17 g Oral DAILY    levothyroxine (SYNTHROID) tablet 175 mcg  175 mcg Oral ACB    escitalopram oxalate (LEXAPRO) tablet 20 mg  20 mg Oral QHS    venlafaxine-SR (EFFEXOR-XR) capsule 150 mg  150 mg Oral DAILY    sodium chloride (NS) flush 5-10 mL  5-10 mL IntraVENous Q8H    sodium chloride (NS) flush 5-10 mL  5-10 mL IntraVENous PRN    enoxaparin (LOVENOX) injection 80 mg  1 mg/kg SubCUTAneous Q12H     ______________________________________________________________________  EXPECTED LENGTH OF STAY: 3d 2h  ACTUAL LENGTH OF STAY:          2                 Ayleen Dudley MD

## 2017-06-09 ENCOUNTER — APPOINTMENT (OUTPATIENT)
Dept: GENERAL RADIOLOGY | Age: 47
DRG: 299 | End: 2017-06-09
Attending: HOSPITALIST
Payer: COMMERCIAL

## 2017-06-09 VITALS
TEMPERATURE: 97.8 F | WEIGHT: 190.7 LBS | BODY MASS INDEX: 28.24 KG/M2 | HEIGHT: 69 IN | OXYGEN SATURATION: 99 % | HEART RATE: 104 BPM | RESPIRATION RATE: 18 BRPM | DIASTOLIC BLOOD PRESSURE: 56 MMHG | SYSTOLIC BLOOD PRESSURE: 98 MMHG

## 2017-06-09 LAB
ANION GAP BLD CALC-SCNC: 10 MMOL/L (ref 5–15)
BASOPHILS # BLD AUTO: 0 K/UL (ref 0–0.1)
BASOPHILS # BLD: 0 % (ref 0–1)
BUN SERPL-MCNC: 2 MG/DL (ref 6–20)
BUN/CREAT SERPL: 4 (ref 12–20)
CALCIUM SERPL-MCNC: 8.2 MG/DL (ref 8.5–10.1)
CHLORIDE SERPL-SCNC: 99 MMOL/L (ref 97–108)
CO2 SERPL-SCNC: 28 MMOL/L (ref 21–32)
CREAT SERPL-MCNC: 0.46 MG/DL (ref 0.55–1.02)
EOSINOPHIL # BLD: 0.3 K/UL (ref 0–0.4)
EOSINOPHIL NFR BLD: 3 % (ref 0–7)
ERYTHROCYTE [DISTWIDTH] IN BLOOD BY AUTOMATED COUNT: 13.2 % (ref 11.5–14.5)
GLUCOSE SERPL-MCNC: 110 MG/DL (ref 65–100)
HCT VFR BLD AUTO: 32.9 % (ref 35–47)
HGB BLD-MCNC: 11.4 G/DL (ref 11.5–16)
LACTATE SERPL-SCNC: 0.9 MMOL/L (ref 0.4–2)
LYMPHOCYTES # BLD AUTO: 10 % (ref 12–49)
LYMPHOCYTES # BLD: 1.2 K/UL (ref 0.8–3.5)
MCH RBC QN AUTO: 27.7 PG (ref 26–34)
MCHC RBC AUTO-ENTMCNC: 34.7 G/DL (ref 30–36.5)
MCV RBC AUTO: 79.9 FL (ref 80–99)
MONOCYTES # BLD: 1.1 K/UL (ref 0–1)
MONOCYTES NFR BLD AUTO: 9 % (ref 5–13)
NEUTS SEG # BLD: 9.7 K/UL (ref 1.8–8)
NEUTS SEG NFR BLD AUTO: 78 % (ref 32–75)
PLATELET # BLD AUTO: 329 K/UL (ref 150–400)
POTASSIUM SERPL-SCNC: 3 MMOL/L (ref 3.5–5.1)
RBC # BLD AUTO: 4.12 M/UL (ref 3.8–5.2)
SODIUM SERPL-SCNC: 137 MMOL/L (ref 136–145)
WBC # BLD AUTO: 12.4 K/UL (ref 3.6–11)

## 2017-06-09 PROCEDURE — 85025 COMPLETE CBC W/AUTO DIFF WBC: CPT | Performed by: FAMILY MEDICINE

## 2017-06-09 PROCEDURE — 87040 BLOOD CULTURE FOR BACTERIA: CPT | Performed by: FAMILY MEDICINE

## 2017-06-09 PROCEDURE — 74011250637 HC RX REV CODE- 250/637: Performed by: FAMILY MEDICINE

## 2017-06-09 PROCEDURE — 74011250636 HC RX REV CODE- 250/636: Performed by: FAMILY MEDICINE

## 2017-06-09 PROCEDURE — 83605 ASSAY OF LACTIC ACID: CPT | Performed by: FAMILY MEDICINE

## 2017-06-09 PROCEDURE — 71020 XR CHEST PA LAT: CPT

## 2017-06-09 PROCEDURE — 74011250637 HC RX REV CODE- 250/637: Performed by: HOSPITALIST

## 2017-06-09 PROCEDURE — 36415 COLL VENOUS BLD VENIPUNCTURE: CPT | Performed by: FAMILY MEDICINE

## 2017-06-09 PROCEDURE — 80048 BASIC METABOLIC PNL TOTAL CA: CPT | Performed by: FAMILY MEDICINE

## 2017-06-09 RX ORDER — ADHESIVE BANDAGE
30 BANDAGE TOPICAL ONCE
Status: DISCONTINUED | OUTPATIENT
Start: 2017-06-09 | End: 2017-06-09

## 2017-06-09 RX ORDER — POTASSIUM CHLORIDE 750 MG/1
40 TABLET, FILM COATED, EXTENDED RELEASE ORAL
Status: COMPLETED | OUTPATIENT
Start: 2017-06-09 | End: 2017-06-09

## 2017-06-09 RX ORDER — NAPROXEN 500 MG/1
500 TABLET ORAL 2 TIMES DAILY WITH MEALS
Qty: 6 TAB | Refills: 0 | Status: SHIPPED | OUTPATIENT
Start: 2017-06-09

## 2017-06-09 RX ORDER — HYDROCODONE BITARTRATE AND ACETAMINOPHEN 5; 325 MG/1; MG/1
1 TABLET ORAL
Qty: 12 TAB | Refills: 0 | Status: SHIPPED | OUTPATIENT
Start: 2017-06-09

## 2017-06-09 RX ORDER — POLYETHYLENE GLYCOL 3350 17 G/17G
17 POWDER, FOR SOLUTION ORAL DAILY
Qty: 30 PACKET | Refills: 0 | Status: SHIPPED | OUTPATIENT
Start: 2017-06-09

## 2017-06-09 RX ORDER — FAMOTIDINE 20 MG/1
20 TABLET, FILM COATED ORAL 2 TIMES DAILY
Qty: 20 TAB | Refills: 0 | Status: SHIPPED | OUTPATIENT
Start: 2017-06-09

## 2017-06-09 RX ORDER — AMOXICILLIN 250 MG
2 CAPSULE ORAL 2 TIMES DAILY
Qty: 20 TAB | Refills: 0 | Status: SHIPPED | OUTPATIENT
Start: 2017-06-09

## 2017-06-09 RX ORDER — FAMOTIDINE 20 MG/1
20 TABLET, FILM COATED ORAL 2 TIMES DAILY
Status: DISCONTINUED | OUTPATIENT
Start: 2017-06-09 | End: 2017-06-09 | Stop reason: HOSPADM

## 2017-06-09 RX ADMIN — Medication 10 ML: at 14:00

## 2017-06-09 RX ADMIN — VENLAFAXINE HYDROCHLORIDE 150 MG: 150 CAPSULE, EXTENDED RELEASE ORAL at 08:12

## 2017-06-09 RX ADMIN — Medication 10 ML: at 06:38

## 2017-06-09 RX ADMIN — FAMOTIDINE 20 MG: 20 TABLET, FILM COATED ORAL at 09:01

## 2017-06-09 RX ADMIN — LEVOTHYROXINE SODIUM 175 MCG: 150 TABLET ORAL at 06:38

## 2017-06-09 RX ADMIN — POTASSIUM CHLORIDE 40 MEQ: 750 TABLET, FILM COATED, EXTENDED RELEASE ORAL at 08:12

## 2017-06-09 RX ADMIN — HYDROCODONE BITARTRATE AND ACETAMINOPHEN 1 TABLET: 10; 325 TABLET ORAL at 03:35

## 2017-06-09 RX ADMIN — NAPROXEN 500 MG: 250 TABLET ORAL at 08:12

## 2017-06-09 RX ADMIN — DOCUSATE SODIUM AND SENNOSIDES 1 TABLET: 8.6; 5 TABLET, FILM COATED ORAL at 08:11

## 2017-06-09 RX ADMIN — POLYETHYLENE GLYCOL 3350 17 G: 17 POWDER, FOR SOLUTION ORAL at 08:11

## 2017-06-09 RX ADMIN — ENOXAPARIN SODIUM 80 MG: 80 INJECTION SUBCUTANEOUS at 08:12

## 2017-06-09 NOTE — PROGRESS NOTES
I have reviewed discharge instructions with the patient. The patient verbalized understanding. Opportunities for questions provided and PIV removed.

## 2017-06-09 NOTE — ROUTINE PROCESS
Hospital follow up PCP appointment has been scheduled with Dr. Hiram Yo on Wednesday, 6/14/17 at 10:20 a.m. Pending patient discharge.   Dameon Bowles, Care Management Specialist.

## 2017-06-09 NOTE — ROUTINE PROCESS
Bedside, Verbal and Written shift change report given to Yohannes (oncoming nurse) by More Snyder (offgoing nurse). Report included the following information SBAR, Kardex, ED Summary, Intake/Output, MAR, Accordion, Recent Results, Med Rec Status, Cardiac Rhythm SR/ST and Alarm Parameters .

## 2017-06-09 NOTE — PROGRESS NOTES
Patient with a temperature of 100.7 hospitalist  Was notified on patient's issue. Orders given for labs and ice packs ice on patient's body. Patient also presented on tele a SR/ST with a HR in the 125's to 130's with movement. Patient's temperature is monitored and now at 99.1. Patient's status will be monitored as per patient's plan of care.

## 2017-06-09 NOTE — DISCHARGE SUMMARY
Discharge Summary     Patient:  Edil Kumar       MRN: [id number]       YOB: 1970       Age: 42 y.o. Date of admission:  6/6/2017    Date of discharge:  6/9/2017    Primary care provider: Dr. Key Gonzalez MD    Admitting provider:  Leatha Daugherty MD    Discharging provider:  Nikita Shirley MD - [phone]  If unavailable, call [phone] and ask the  to page the triage hospitalist.    Consultations  · IP CONSULT TO HOSPITALIST    Procedures  · * No surgery found *    Discharge destination: HOME. The patient is stable for discharge. Admission diagnosis  · Pulmonary embolism (HCC)  · Acute deep vein thrombosis (DVT) of left lower extremity (HCC)  · Tachycardia    Current Discharge Medication List      START taking these medications    Details   famotidine (PEPCID) 20 mg tablet Take 1 Tab by mouth two (2) times a day. Qty: 20 Tab, Refills: 0      naproxen (NAPROSYN) 500 mg tablet Take 1 Tab by mouth two (2) times daily (with meals). Qty: 6 Tab, Refills: 0      polyethylene glycol (MIRALAX) 17 gram packet Take 1 Packet by mouth daily. Qty: 30 Packet, Refills: 0      senna-docusate (PERICOLACE) 8.6-50 mg per tablet Take 2 Tabs by mouth two (2) times a day. Qty: 20 Tab, Refills: 0      !! apixaban (ELIQUIS) 5 mg tablet Take 2 Tabs by mouth two (2) times a day for 7 days. Qty: 28 Tab, Refills: 0      !! apixaban (ELIQUIS) 5 mg tablet Take 1 Tab by mouth two (2) times a day for 180 days. Qty: 60 Tab, Refills: 5       !! - Potential duplicate medications found. Please discuss with provider. CONTINUE these medications which have CHANGED    Details   HYDROcodone-acetaminophen (NORCO) 5-325 mg per tablet Take 1 Tab by mouth every four (4) hours as needed for Pain. Max Daily Amount: 6 Tabs.   Qty: 12 Tab, Refills: 0         CONTINUE these medications which have NOT CHANGED    Details escitalopram oxalate (LEXAPRO) 20 mg tablet Take 20 mg by mouth nightly. venlafaxine-SR (EFFEXOR-XR) 150 mg capsule Take 150 mg by mouth daily. Biotin 2,500 mcg cap Take 1 Can by mouth daily. !! levothyroxine (SYNTHROID) 112 mcg tablet Take 112 mcg by mouth Daily (before breakfast). Takes 112+12.5+50      !! levothyroxine (SYNTHROID) 25 mcg tablet Take 12.5 mcg by mouth Daily (before breakfast). Takes 112+12.5+50      !! levothyroxine (SYNTHROID) 50 mcg tablet Take 50 mcg by mouth Daily (before breakfast). Takes 112+12.5+50       ! ! - Potential duplicate medications found. Please discuss with provider. STOP taking these medications       ibuprofen (MOTRIN) 600 mg tablet Comments:   Reason for Stopping:         cyclobenzaprine (FLEXERIL) 5 mg tablet Comments:   Reason for Stopping:         L-Norgest&E Estradiol-E Estrad (CAMRESE LO) 0.10 mg-20 mcg (84)/10 mcg (7) 3MPk Comments:   Reason for Stopping: Follow-up Information     Follow up With Details Comments [address] [place] [address] MD Toño In 1 week Discharge follow up  [place] [address]  [phone]            Final discharge diagnoses and brief hospital course  Please also refer to the admission H&P for details on the presenting problem. 41 yo female with PMhx of Hypothyroidism, Neurocardiogenic syncope admitted for low back pain after moving luggage, followed by Leg leg pain which was worsening. 1. Acute B/L PE and LLE DVT  - possibly related to OCP  - d/c OCP  - Lovenox BID, discussed OAC, will proceed with Eliquis for 6 months  - pain control  - ambulate     2. AG-Metabolic Acidosis  - resolved     3. Hypothyroidism   - c/w home meds     4. Hypokalemia - replaced    5. Constipation  - c/w Miralax, Pericolace     6. Low Grade Fever, possibly related to PE/DVT   - cxr clear  - no cough, chest pain, sob, dysuria noted.   - no need for abx      Physical examination at discharge  Visit Vitals    BP 95/61 (BP 1 Location: Right arm, BP Patient Position: Supine)    Pulse (!) 109    Temp 99.5 °F (37.5 °C)    Resp 18    Ht 5' 9\" (1.753 m)    Wt 86.5 kg (190 lb 11.2 oz)    SpO2 96%    BMI 28.16 kg/m2     AO3  PERRLA  LUNG: CTA  CVS; RRR  ABD; SOFT NT ND  EXT: left LE swelling improved, mild tenderness but better    Pertinent imaging studies:    CTA Chest:  The lungs show a lateral posterior atelectasis but otherwise are clear of mass, nodule, airspace disease or edema.     There are multiple filling defects within pulmonary arterial branches of the left and right lower lobes. Heart is normal in size without pericardial effusion. No evident right heart strain. Venous Duplex LE:  Left: Consistent with thrombosis involving the common femoral  and greater saphenous vein as demonstrated by vein  non-compressibility, and by a narrowing or occlusion of the flow  channel on color Doppler imaging. The deep femoral, femoral,  popliteal, posterior tibial, peroneal, and great saphenous (below  knee) are patent and without evidence of thrombus; each is fully  compressible and there is no narrowing of the flow channel on color  Doppler imaging. The superficial greater saphenous thrombus extends from groin to mid thigh before becoming patent. The thrombus appears to enter the common femoral vein. Phasic flow is observed in the common femoral vein. ECHO:  SUMMARY:  Left ventricle: Systolic function was normal. Ejection fraction was  estimated in the range of 65 % to 70 %. There were no regional wall motion  abnormalities. Aorta, systemic arteries: The root exhibited mild dilatation.     Recent Labs      06/09/17   0355  06/07/17   0446  06/06/17 1958   WBC  12.4*  10.6  12.5*   HGB  11.4*  11.3*  13.5   HCT  32.9*  33.4*  39.0   PLT  329  304  364     Recent Labs      06/09/17   0355  06/08/17   0351  06/07/17   0555   NA  137  138  137   K  3.0*  3.2*  3.7   CL  99  104  105 CO2  28  22  16*   BUN  2*  2*  7   CREA  0.46*  0.40*  0.45*   GLU  110*  94  80   CA  8.2*  8.3*  7.9*     Recent Labs      06/06/17 1958   SGOT  12*   AP  81   TP  8.4*   ALB  3.8   GLOB  4.6*     Recent Labs      06/06/17 1958   INR  1.0   PTP  10.4   APTT  28.1      No results for input(s): FE, TIBC, PSAT, FERR in the last 72 hours. No results for input(s): PH, PCO2, PO2 in the last 72 hours. No results for input(s): CPK, CKMB in the last 72 hours. No lab exists for component: TROPONINI  No components found for: [place]    Chronic Diagnoses:    Problem List as of 6/9/2017  Date Reviewed: 6/6/2017          Codes Class Noted - Resolved    * (Principal)Pulmonary embolism ([facility] [address] ICD-10-CM: I26.99  ICD-9-CM: 415.19  6/6/2017 - Present        Tachycardia ICD-10-CM: R00.0  ICD-9-CM: 785.0  6/6/2017 - Present        Acute deep vein thrombosis (DVT) of left lower extremity ([facility] [address] ICD-10-CM: E79.750  ICD-9-CM: 453.40  6/6/2017 - Present              Time spent on discharge related activities today greater than 30 minutes.       Signed:  Reva Washburn MD                 Hospitalist, Internal Medicine      Cc: Phillip Purcell MD

## 2017-06-09 NOTE — DISCHARGE INSTRUCTIONS
Please bring this form with you to show your primary care provider at your follow-up appointment. Primary care provider:  Dr. Munir Duffy MD    Discharging provider:  Minerva Hercules MD    You have been admitted to the hospital with the following diagnoses:  · Pulmonary embolism ([address]  · Acute deep vein thrombosis (DVT) of left lower extremity ([facility] [address]  · Tachycardia    FOLLOW-UP CARE RECOMMENDATIONS:    APPOINTMENTS:  Follow-up Information     Follow up With Details Comments Calvin Lundberg MD In 1 week Discharge follow up  [address] [address] [address] [phone]              FOLLOW-UP TESTS recommended:   - you will be taking blood thinners for 6 months  - no driving or operating machinery while on pain medication      PENDING TEST RESULTS:  At the time of your discharge the following test results are still pending: none  Please make sure you review these results with your outpatient follow-up provider(s). SYMPTOMS to watch for: chest pain, shortness of breath, fever, chills, nausea, vomiting, diarrhea, change in mentation, falling, weakness, bleeding. DIET/what to eat:  Regular Diet    ACTIVITY:  Activity as tolerated    WOUND CARE: none    EQUIPMENT needed:  none      What to do if new or unexpected symptoms occur? If you experience any of the above symptoms (or should other concerns or questions arise after discharge) please call your primary care physician. Return to the emergency room if you cannot get hold of your doctor. · It is very important that you keep your follow-up appointment(s). · Please bring discharge papers, medication list (and/or medication bottles) to your follow-up appointments for review by your outpatient provider(s). · Please check the list of medications and be sure it includes every medication (even non-prescription medications) that your provider wants you to take.     · It is important that you take the medication exactly as they are prescribed. · Keep your medication in the bottles provided by the pharmacist and keep a list of the medication names, dosages, and times to be taken in your wallet. · Do not take other medications without consulting your doctor. · If you have any questions about your medications or other instructions, please talk to your nurse or care provider before you leave the hospital.    I understand that if any problems occur once I am at home I am to contact my physician. These instructions were explained to me and I had the opportunity to ask questions.

## 2017-06-12 ENCOUNTER — PATIENT OUTREACH (OUTPATIENT)
Dept: OTHER | Age: 47
End: 2017-06-12

## 2017-06-12 NOTE — PROGRESS NOTES
YOUNG NOTE:     ED YOUNG episode resolved. New YOUNG episode for inpt stay started. Ms. Lenny Lee has been contacted regarding recent  inpatient stay for DVT/ PE with DC . Verified  and address for HIPAA security. Explained role and verified PCP. Discharge medications were reviewed with the patient by telephone. * DC Friday. Sister came to [place] and stayed with her in hotel. Driving back today - in back seat with leg extended. Heart rate is better now. Pain in leg is still pronounced, but trying not to take pain medications. FU apt made for tomorrow with PCP        Date of IP Admission:     -    Facility patient visited:   [facility]   Reason for Visit:   DVT/  PE     Reviewed scripts given by ED? Yes - verified dosage of Eliquis **  Stopped BCPs and NSAIDs     Able to obtain prescribed medication? Yes   How is the patient feeling? Pt stated better/  Still pain in leg   Does the patient have any questions or problems? Not at this time   Any barriers with transportation? No - family will provide   Follow-up Appointment date: Yes  - tomorrow         Reviewed Discharge instructions & Red Flags:  Ms. Lenny Lee is very alert to problems and potential complications. I advised the patient to bring her medications in the original bottles and to seek emergency care if symptoms return before scheduled appointment. Provided patient with my name and contact information and instructed patient if there are any question to call. No further needs at this time.     FU  Call Friday:        STOP taking these medications         ibuprofen (MOTRIN) 600 mg tablet Comments:   Reason for Stopping:            cyclobenzaprine (FLEXERIL) 5 mg tablet Comments:   Reason for Stopping:            L-Norgest&E Estradiol-E Estrad (CAMRESE LO) 0.10 mg-20 mcg (84)/10 mcg (7) 3MPk Comments:   Reason for Stopping:               START taking these medications     Details   famotidine (PEPCID) 20 mg tablet Take 1 Tab by mouth two (2) times a day. Qty: 20 Tab, Refills: 0       naproxen (NAPROSYN) 500 mg tablet Take 1 Tab by mouth two (2) times daily (with meals). Qty: 6 Tab, Refills: 0       polyethylene glycol (MIRALAX) 17 gram packet Take 1 Packet by mouth daily. Qty: 30 Packet, Refills: 0       senna-docusate (PERICOLACE) 8.6-50 mg per tablet Take 2 Tabs by mouth two (2) times a day. Qty: 20 Tab, Refills: 0       !! apixaban (ELIQUIS) 5 mg tablet Take 2 Tabs by mouth two (2) times a day for 7 days. Qty: 28 Tab, Refills: 0       !! apixaban (ELIQUIS) 5 mg tablet Take 1 Tab by mouth two (2) times a day for 180 days. Qty: 60 Tab, Refills: 5       !! - Potential duplicate medications found. Please discuss with provider.             CONTINUE these medications which have CHANGED     Details   HYDROcodone-acetaminophen (NORCO) 5-325 mg per tablet Take 1 Tab by mouth every four (4) hours as needed for Pain. Max Daily Amount: 6 Tabs.   Qty: 12 Tab, Refills: 0         You have been admitted to the hospital with the following diagnoses:  · Pulmonary embolism (HCC)  · Acute deep vein thrombosis (DVT) of left lower extremity (HCC)  · Tachycardia     FOLLOW-UP CARE RECOMMENDATIONS:     APPOINTMENTS:  Follow-up Information     Follow up With Details Comments Contact Info     Prasanna Hernandez MD In 1 week Discharge follow up  [address] [facility] [address] [phone]               FOLLOW-UP TESTS recommended:   - you will be taking blood thinners for 6 months  - no driving or operating machinery while on pain medication

## 2017-06-14 LAB
BACTERIA SPEC CULT: NORMAL
SERVICE CMNT-IMP: NORMAL

## 2017-06-16 ENCOUNTER — PATIENT OUTREACH (OUTPATIENT)
Dept: OTHER | Age: 47
End: 2017-06-16

## 2017-06-16 NOTE — PROGRESS NOTES
Telephone outreach attempted for [facility] follow-up from 06/07/17 admission for PE. This CM is covering for Carmen Mane RN. Patient reports that she is currently in the car driving home to meet up with her family, so is unable to talk. She denies any questions or concerns and reports she is \"getting better every day\". She is open for another [facility] outreach in the next few weeks.

## 2017-07-07 ENCOUNTER — PATIENT OUTREACH (OUTPATIENT)
Dept: OTHER | Age: 47
End: 2017-07-07

## 2017-07-07 NOTE — PROGRESS NOTES
Resolving current episode (Transitions of care complete). No further ED/UC or hospital admissions within 30 days post discharge. Patient attended follow-up appointments as directed. Resolving goals for YOUNG needs. Ms. Bety Grewal is low risk with low illness burden. No identified continued need or expressed intrest for CM with competent management for medical needs. Left discreet VM to call me if further needs arise. No outreach from patient to [address] [place] [address].